# Patient Record
Sex: MALE | Race: ASIAN | NOT HISPANIC OR LATINO | URBAN - METROPOLITAN AREA
[De-identification: names, ages, dates, MRNs, and addresses within clinical notes are randomized per-mention and may not be internally consistent; named-entity substitution may affect disease eponyms.]

---

## 2017-02-12 ENCOUNTER — COMMUNICATION - HEALTHEAST (OUTPATIENT)
Dept: FAMILY MEDICINE | Facility: CLINIC | Age: 57
End: 2017-02-12

## 2017-02-12 DIAGNOSIS — I10 HTN (HYPERTENSION): ICD-10-CM

## 2017-02-12 DIAGNOSIS — E78.5 HYPERLIPIDEMIA: ICD-10-CM

## 2017-02-15 ENCOUNTER — COMMUNICATION - HEALTHEAST (OUTPATIENT)
Dept: CARDIOLOGY | Facility: CLINIC | Age: 57
End: 2017-02-15

## 2017-02-15 DIAGNOSIS — I25.5 ISCHEMIC CARDIOMYOPATHY: ICD-10-CM

## 2017-03-14 ENCOUNTER — OFFICE VISIT - HEALTHEAST (OUTPATIENT)
Dept: FAMILY MEDICINE | Facility: CLINIC | Age: 57
End: 2017-03-14

## 2017-03-14 DIAGNOSIS — E11.9 TYPE 2 DIABETES MELLITUS WITHOUT COMPLICATION (H): ICD-10-CM

## 2017-03-14 DIAGNOSIS — I10 ESSENTIAL HYPERTENSION, BENIGN: ICD-10-CM

## 2017-03-14 DIAGNOSIS — I25.10 CAD (CORONARY ARTERY DISEASE): ICD-10-CM

## 2017-03-14 DIAGNOSIS — E78.00 PURE HYPERCHOLESTEROLEMIA: ICD-10-CM

## 2017-03-14 DIAGNOSIS — I67.89 ACUTE, BUT ILL-DEFINED, CEREBROVASCULAR DISEASE: ICD-10-CM

## 2017-03-14 DIAGNOSIS — Z01.818 PRE-OP EXAMINATION: ICD-10-CM

## 2017-03-14 ASSESSMENT — MIFFLIN-ST. JEOR: SCORE: 1402.32

## 2017-03-25 ENCOUNTER — COMMUNICATION - HEALTHEAST (OUTPATIENT)
Dept: LAB | Facility: CLINIC | Age: 57
End: 2017-03-25

## 2017-03-25 DIAGNOSIS — E11.9 DIABETES (H): ICD-10-CM

## 2017-03-26 ENCOUNTER — COMMUNICATION - HEALTHEAST (OUTPATIENT)
Dept: SCHEDULING | Facility: CLINIC | Age: 57
End: 2017-03-26

## 2017-03-27 ENCOUNTER — OFFICE VISIT - HEALTHEAST (OUTPATIENT)
Dept: FAMILY MEDICINE | Facility: CLINIC | Age: 57
End: 2017-03-27

## 2017-03-27 DIAGNOSIS — I10 ESSENTIAL HYPERTENSION, BENIGN: ICD-10-CM

## 2017-03-27 DIAGNOSIS — E78.00 PURE HYPERCHOLESTEROLEMIA: ICD-10-CM

## 2017-03-27 DIAGNOSIS — I69.30 HISTORY OF STROKE WITH RESIDUAL DEFICIT: ICD-10-CM

## 2017-03-27 DIAGNOSIS — I42.9 CARDIOMYOPATHY (H): ICD-10-CM

## 2017-04-06 ENCOUNTER — HOSPITAL ENCOUNTER (OUTPATIENT)
Dept: CARDIOLOGY | Facility: HOSPITAL | Age: 57
Discharge: HOME | End: 2017-04-06
Attending: FAMILY MEDICINE

## 2017-04-06 DIAGNOSIS — I42.9 CARDIOMYOPATHY (H): ICD-10-CM

## 2017-04-06 ASSESSMENT — MIFFLIN-ST. JEOR: SCORE: 1415.93

## 2017-04-07 ENCOUNTER — COMMUNICATION - HEALTHEAST (OUTPATIENT)
Dept: FAMILY MEDICINE | Facility: CLINIC | Age: 57
End: 2017-04-07

## 2017-04-07 LAB
AORTIC ROOT: 3.7 CM
AR DECEL SLOPE: 1960 MM/S2
AR PEAK VELOCITY: 418 CM/S
AV REGURGITANT PEAK GRADIENT: 69.9 MMHG
AV REGURGITATION PRESSURE HALF TIME: 611 MS
BSA FOR ECHO PROCEDURE: 1.76 M2
CV BLOOD PRESSURE: NORMAL MMHG
CV ECHO HEIGHT: 64 IN
CV ECHO WEIGHT: 151 LBS
DOP CALC LVOT AREA: 3.46 CM2
DOP CALC LVOT DIAMETER: 2.1 CM
DOP CALC LVOT PEAK VEL: 82.9 CM/S
DOP CALC LVOT STROKE VOLUME: 49.5 CM3
DOP CALCLVOT PEAK VEL VTI: 14.3 CM
ECHO EJECTION FRACTION ESTIMATED: 20 %
EJECTION FRACTION: 17 % (ref 55–75)
FRACTIONAL SHORTENING: 8.6 % (ref 28–44)
INTERVENTRICULAR SEPTUM IN END DIASTOLE: 1.02 CM (ref 0.6–1)
IVS/PW RATIO: 1.1
LA AREA 1: 14.8 CM2
LEFT ATRIUM LENGTH: 4.7 CM
LEFT ATRIUM SIZE: 3.6 CM
LEFT VENTRICLE CARDIAC INDEX: 2 L/MIN/M2
LEFT VENTRICLE CARDIAC OUTPUT: 3.5 L/MIN
LEFT VENTRICLE DIASTOLIC VOLUME INDEX: 94.3 CM3/M2 (ref 34–74)
LEFT VENTRICLE DIASTOLIC VOLUME: 166 CM3 (ref 62–150)
LEFT VENTRICLE HEART RATE: 70 BPM
LEFT VENTRICLE MASS INDEX: 112.5 G/M2
LEFT VENTRICLE SYSTOLIC VOLUME INDEX: 77.8 CM3/M2 (ref 11–31)
LEFT VENTRICLE SYSTOLIC VOLUME: 137 CM3 (ref 21–61)
LEFT VENTRICULAR INTERNAL DIMENSION IN DIASTOLE: 5.35 CM (ref 4.2–5.8)
LEFT VENTRICULAR INTERNAL DIMENSION IN SYSTOLE: 4.89 CM (ref 2.5–4)
LEFT VENTRICULAR MASS: 197.9 G
LEFT VENTRICULAR OUTFLOW TRACT MEAN GRADIENT: 1 MMHG
LEFT VENTRICULAR OUTFLOW TRACT MEAN VELOCITY: 55.2 CM/S
LEFT VENTRICULAR OUTFLOW TRACT PEAK GRADIENT: 3 MMHG
LEFT VENTRICULAR POSTERIOR WALL IN END DIASTOLE: 0.94 CM (ref 0.6–1)
LV STROKE VOLUME INDEX: 28.1 ML/M2
MITRAL REGURGITANT VELOCITY TIME INTEGRAL: 181 CM
MITRAL VALVE E/A RATIO: 0.9
MR FLOW: 74 CM3
MR MEAN GRADIENT: 70 MMHG
MR MEAN VELOCITY: 388 CM/S
MR PEAK GRADIENT: 105.3 MMHG
MV AVERAGE E/E' RATIO: 7.5 CM/S
MV DECELERATION TIME: 155 MS
MV E'TISSUE VEL-LAT: 18.1 CM/S
MV E'TISSUE VEL-MED: 6.77 CM/S
MV LATERAL E/E' RATIO: 5.1
MV MEDIAL E/E' RATIO: 13.8
MV PEAK A VELOCITY: 108 CM/S
MV PEAK E VELOCITY: 93.1 CM/S
NUC REST DIASTOLIC VOLUME INDEX: 2416 LBS
NUC REST SYSTOLIC VOLUME INDEX: 64 IN
PISA MR PEAK VEL: 513 CM/S
TRICUSPID REGURGITATION PEAK PRESSURE GRADIENT: 42.5 MMHG
TRICUSPID VALVE ANULAR PLANE SYSTOLIC EXCURSION: 1.6 CM
TRICUSPID VALVE PEAK REGURGITANT VELOCITY: 326 CM/S

## 2017-04-11 ENCOUNTER — COMMUNICATION - HEALTHEAST (OUTPATIENT)
Dept: FAMILY MEDICINE | Facility: CLINIC | Age: 57
End: 2017-04-11

## 2017-04-11 ENCOUNTER — AMBULATORY - HEALTHEAST (OUTPATIENT)
Dept: LAB | Facility: CLINIC | Age: 57
End: 2017-04-11

## 2017-04-11 DIAGNOSIS — Z00.00 HEALTH CARE MAINTENANCE: ICD-10-CM

## 2017-04-19 ENCOUNTER — RECORDS - HEALTHEAST (OUTPATIENT)
Dept: ADMINISTRATIVE | Facility: OTHER | Age: 57
End: 2017-04-19

## 2017-04-20 ENCOUNTER — RECORDS - HEALTHEAST (OUTPATIENT)
Dept: ADMINISTRATIVE | Facility: OTHER | Age: 57
End: 2017-04-20

## 2017-04-23 ENCOUNTER — RECORDS - HEALTHEAST (OUTPATIENT)
Dept: ADMINISTRATIVE | Facility: OTHER | Age: 57
End: 2017-04-23

## 2017-04-25 ENCOUNTER — RECORDS - HEALTHEAST (OUTPATIENT)
Dept: ADMINISTRATIVE | Facility: OTHER | Age: 57
End: 2017-04-25

## 2017-05-01 ENCOUNTER — RECORDS - HEALTHEAST (OUTPATIENT)
Dept: ADMINISTRATIVE | Facility: OTHER | Age: 57
End: 2017-05-01

## 2017-05-17 ENCOUNTER — RECORDS - HEALTHEAST (OUTPATIENT)
Dept: ADMINISTRATIVE | Facility: OTHER | Age: 57
End: 2017-05-17

## 2017-06-06 ENCOUNTER — RECORDS - HEALTHEAST (OUTPATIENT)
Dept: ADMINISTRATIVE | Facility: OTHER | Age: 57
End: 2017-06-06

## 2017-06-11 ENCOUNTER — COMMUNICATION - HEALTHEAST (OUTPATIENT)
Dept: CARDIOLOGY | Facility: CLINIC | Age: 57
End: 2017-06-11

## 2017-06-11 DIAGNOSIS — I10 ESSENTIAL HYPERTENSION: ICD-10-CM

## 2017-06-23 ENCOUNTER — COMMUNICATION - HEALTHEAST (OUTPATIENT)
Dept: ADMINISTRATIVE | Facility: CLINIC | Age: 57
End: 2017-06-23

## 2017-07-24 ENCOUNTER — COMMUNICATION - HEALTHEAST (OUTPATIENT)
Dept: FAMILY MEDICINE | Facility: CLINIC | Age: 57
End: 2017-07-24

## 2017-07-26 ENCOUNTER — OFFICE VISIT - HEALTHEAST (OUTPATIENT)
Dept: FAMILY MEDICINE | Facility: CLINIC | Age: 57
End: 2017-07-26

## 2017-07-26 DIAGNOSIS — I25.10 CORONARY ARTERY DISEASE DUE TO LIPID RICH PLAQUE: ICD-10-CM

## 2017-07-26 DIAGNOSIS — I25.83 CORONARY ARTERY DISEASE DUE TO LIPID RICH PLAQUE: ICD-10-CM

## 2017-07-26 DIAGNOSIS — H66.90 OTITIS MEDIA: ICD-10-CM

## 2017-07-26 DIAGNOSIS — H72.91 TYMPANIC MEMBRANE PERFORATION, RIGHT: ICD-10-CM

## 2017-07-26 DIAGNOSIS — I69.30 HISTORY OF STROKE WITH RESIDUAL DEFICIT: ICD-10-CM

## 2017-07-26 DIAGNOSIS — I10 ESSENTIAL HYPERTENSION, BENIGN: ICD-10-CM

## 2017-07-26 DIAGNOSIS — E78.00 PURE HYPERCHOLESTEROLEMIA: ICD-10-CM

## 2017-07-26 DIAGNOSIS — I42.9 CARDIOMYOPATHY (H): ICD-10-CM

## 2017-07-26 DIAGNOSIS — Z23 IMMUNIZATION DUE: ICD-10-CM

## 2017-07-26 DIAGNOSIS — E11.9 TYPE 2 DIABETES MELLITUS WITHOUT COMPLICATION, WITHOUT LONG-TERM CURRENT USE OF INSULIN (H): ICD-10-CM

## 2017-07-26 DIAGNOSIS — H66.91: ICD-10-CM

## 2017-07-26 LAB
HBA1C MFR BLD: 6.3 % (ref 3.5–6)
LDLC SERPL CALC-MCNC: 78 MG/DL

## 2017-07-27 ENCOUNTER — RECORDS - HEALTHEAST (OUTPATIENT)
Dept: ADMINISTRATIVE | Facility: OTHER | Age: 57
End: 2017-07-27

## 2017-07-27 ENCOUNTER — COMMUNICATION - HEALTHEAST (OUTPATIENT)
Dept: FAMILY MEDICINE | Facility: CLINIC | Age: 57
End: 2017-07-27

## 2017-12-29 ENCOUNTER — OFFICE VISIT - HEALTHEAST (OUTPATIENT)
Dept: FAMILY MEDICINE | Facility: CLINIC | Age: 57
End: 2017-12-29

## 2017-12-29 ENCOUNTER — COMMUNICATION - HEALTHEAST (OUTPATIENT)
Dept: FAMILY MEDICINE | Facility: CLINIC | Age: 57
End: 2017-12-29

## 2017-12-29 DIAGNOSIS — I25.10 CORONARY ARTERY DISEASE DUE TO LIPID RICH PLAQUE: ICD-10-CM

## 2017-12-29 DIAGNOSIS — E11.9 TYPE 2 DIABETES MELLITUS WITHOUT COMPLICATION (H): ICD-10-CM

## 2017-12-29 DIAGNOSIS — I25.83 CORONARY ARTERY DISEASE DUE TO LIPID RICH PLAQUE: ICD-10-CM

## 2017-12-29 DIAGNOSIS — I69.30 HISTORY OF STROKE WITH RESIDUAL DEFICIT: ICD-10-CM

## 2017-12-29 DIAGNOSIS — I10 ESSENTIAL HYPERTENSION, BENIGN: ICD-10-CM

## 2017-12-29 DIAGNOSIS — E78.00 PURE HYPERCHOLESTEROLEMIA: ICD-10-CM

## 2017-12-29 LAB — HBA1C MFR BLD: 6.1 % (ref 3.5–6)

## 2018-01-09 ENCOUNTER — AMBULATORY - HEALTHEAST (OUTPATIENT)
Dept: NURSING | Facility: CLINIC | Age: 58
End: 2018-01-09

## 2018-01-15 ENCOUNTER — AMBULATORY - HEALTHEAST (OUTPATIENT)
Dept: CARDIOLOGY | Facility: CLINIC | Age: 58
End: 2018-01-15

## 2018-01-24 ENCOUNTER — AMBULATORY - HEALTHEAST (OUTPATIENT)
Dept: NURSING | Facility: CLINIC | Age: 58
End: 2018-01-24

## 2018-05-02 ENCOUNTER — COMMUNICATION - HEALTHEAST (OUTPATIENT)
Dept: SCHEDULING | Facility: CLINIC | Age: 58
End: 2018-05-02

## 2018-05-23 ENCOUNTER — OFFICE VISIT - HEALTHEAST (OUTPATIENT)
Dept: FAMILY MEDICINE | Facility: CLINIC | Age: 58
End: 2018-05-23

## 2018-05-23 ENCOUNTER — COMMUNICATION - HEALTHEAST (OUTPATIENT)
Dept: FAMILY MEDICINE | Facility: CLINIC | Age: 58
End: 2018-05-23

## 2018-05-23 DIAGNOSIS — I42.9 CARDIOMYOPATHY (H): ICD-10-CM

## 2018-05-23 DIAGNOSIS — E78.00 PURE HYPERCHOLESTEROLEMIA: ICD-10-CM

## 2018-05-23 DIAGNOSIS — E11.9 TYPE 2 DIABETES MELLITUS WITHOUT COMPLICATION (H): ICD-10-CM

## 2018-05-23 DIAGNOSIS — I10 ESSENTIAL HYPERTENSION, BENIGN: ICD-10-CM

## 2018-05-23 DIAGNOSIS — H72.90 TYMPANIC MEMBRANE PERFORATION: ICD-10-CM

## 2018-05-23 DIAGNOSIS — I69.30 HISTORY OF STROKE WITH RESIDUAL DEFICIT: ICD-10-CM

## 2018-05-23 LAB
CREAT UR-MCNC: 193.6 MG/DL
MICROALBUMIN UR-MCNC: 8.57 MG/DL (ref 0–1.99)
MICROALBUMIN/CREAT UR: 44.3 MG/G

## 2018-06-01 ENCOUNTER — COMMUNICATION - HEALTHEAST (OUTPATIENT)
Dept: SCHEDULING | Facility: CLINIC | Age: 58
End: 2018-06-01

## 2018-06-01 DIAGNOSIS — E78.5 HYPERLIPIDEMIA: ICD-10-CM

## 2018-06-08 ENCOUNTER — COMMUNICATION - HEALTHEAST (OUTPATIENT)
Dept: CARDIOLOGY | Facility: CLINIC | Age: 58
End: 2018-06-08

## 2018-06-08 ENCOUNTER — OFFICE VISIT - HEALTHEAST (OUTPATIENT)
Dept: CARDIOLOGY | Facility: CLINIC | Age: 58
End: 2018-06-08

## 2018-06-08 ENCOUNTER — AMBULATORY - HEALTHEAST (OUTPATIENT)
Dept: CARDIOLOGY | Facility: CLINIC | Age: 58
End: 2018-06-08

## 2018-06-08 DIAGNOSIS — Z86.74 HX-SUDDEN CARDIAC ARREST: ICD-10-CM

## 2018-06-08 DIAGNOSIS — I25.5 ISCHEMIC CARDIOMYOPATHY: ICD-10-CM

## 2018-06-08 DIAGNOSIS — Z95.810 ICD (IMPLANTABLE CARDIOVERTER-DEFIBRILLATOR) IN PLACE: ICD-10-CM

## 2018-06-08 DIAGNOSIS — I50.22 CHRONIC SYSTOLIC HEART FAILURE (H): ICD-10-CM

## 2018-06-08 LAB
ANION GAP SERPL CALCULATED.3IONS-SCNC: 6 MMOL/L (ref 5–18)
BNP SERPL-MCNC: 173 PG/ML (ref 0–46)
BUN SERPL-MCNC: 15 MG/DL (ref 8–22)
CALCIUM SERPL-MCNC: 9.4 MG/DL (ref 8.5–10.5)
CHLORIDE BLD-SCNC: 107 MMOL/L (ref 98–107)
CO2 SERPL-SCNC: 29 MMOL/L (ref 22–31)
CREAT SERPL-MCNC: 0.91 MG/DL (ref 0.7–1.3)
GFR SERPL CREATININE-BSD FRML MDRD: >60 ML/MIN/1.73M2
GLUCOSE BLD-MCNC: 159 MG/DL (ref 70–125)
HCC DEVICE COMMENTS: NORMAL
HCC DEVICE IMPLANTING PROVIDER: NORMAL
HCC DEVICE MANUFACTURE: NORMAL
HCC DEVICE MODEL: NORMAL
HCC DEVICE SERIAL NUMBER: NORMAL
HCC DEVICE TYPE: NORMAL
POTASSIUM BLD-SCNC: 4 MMOL/L (ref 3.5–5)
SODIUM SERPL-SCNC: 142 MMOL/L (ref 136–145)

## 2018-06-11 ENCOUNTER — COMMUNICATION - HEALTHEAST (OUTPATIENT)
Dept: CARDIOLOGY | Facility: CLINIC | Age: 58
End: 2018-06-11

## 2018-06-11 DIAGNOSIS — I25.5 ISCHEMIC CARDIOMYOPATHY: ICD-10-CM

## 2018-06-11 DIAGNOSIS — I50.22 CHRONIC SYSTOLIC HEART FAILURE (H): ICD-10-CM

## 2018-06-18 ENCOUNTER — COMMUNICATION - HEALTHEAST (OUTPATIENT)
Dept: CARDIOLOGY | Facility: CLINIC | Age: 58
End: 2018-06-18

## 2018-06-18 DIAGNOSIS — I25.10 CORONARY ARTERY DISEASE DUE TO LIPID RICH PLAQUE: ICD-10-CM

## 2018-06-18 DIAGNOSIS — I25.83 CORONARY ARTERY DISEASE DUE TO LIPID RICH PLAQUE: ICD-10-CM

## 2018-06-20 ENCOUNTER — COMMUNICATION - HEALTHEAST (OUTPATIENT)
Dept: FAMILY MEDICINE | Facility: CLINIC | Age: 58
End: 2018-06-20

## 2018-06-20 DIAGNOSIS — I42.9 CARDIOMYOPATHY (H): ICD-10-CM

## 2018-07-02 ENCOUNTER — OFFICE VISIT - HEALTHEAST (OUTPATIENT)
Dept: CARDIOLOGY | Facility: CLINIC | Age: 58
End: 2018-07-02

## 2018-07-02 ENCOUNTER — AMBULATORY - HEALTHEAST (OUTPATIENT)
Dept: CARDIOLOGY | Facility: CLINIC | Age: 58
End: 2018-07-02

## 2018-07-02 DIAGNOSIS — I42.9 CARDIOMYOPATHY (H): ICD-10-CM

## 2018-07-02 DIAGNOSIS — I10 ESSENTIAL HYPERTENSION, BENIGN: ICD-10-CM

## 2018-07-02 DIAGNOSIS — I50.22 CHRONIC SYSTOLIC HEART FAILURE (H): ICD-10-CM

## 2018-07-02 LAB
ANION GAP SERPL CALCULATED.3IONS-SCNC: 8 MMOL/L (ref 5–18)
BUN SERPL-MCNC: 10 MG/DL (ref 8–22)
CALCIUM SERPL-MCNC: 9.1 MG/DL (ref 8.5–10.5)
CHLORIDE BLD-SCNC: 109 MMOL/L (ref 98–107)
CO2 SERPL-SCNC: 26 MMOL/L (ref 22–31)
CREAT SERPL-MCNC: 0.81 MG/DL (ref 0.7–1.3)
GFR SERPL CREATININE-BSD FRML MDRD: >60 ML/MIN/1.73M2
GLUCOSE BLD-MCNC: 132 MG/DL (ref 70–125)
POTASSIUM BLD-SCNC: 4.4 MMOL/L (ref 3.5–5)
SODIUM SERPL-SCNC: 143 MMOL/L (ref 136–145)

## 2018-07-02 ASSESSMENT — MIFFLIN-ST. JEOR: SCORE: 1429.54

## 2018-07-03 ENCOUNTER — COMMUNICATION - HEALTHEAST (OUTPATIENT)
Dept: CARDIOLOGY | Facility: CLINIC | Age: 58
End: 2018-07-03

## 2018-07-23 ENCOUNTER — AMBULATORY - HEALTHEAST (OUTPATIENT)
Dept: CARDIOLOGY | Facility: CLINIC | Age: 58
End: 2018-07-23

## 2018-07-23 ENCOUNTER — OFFICE VISIT - HEALTHEAST (OUTPATIENT)
Dept: CARDIOLOGY | Facility: CLINIC | Age: 58
End: 2018-07-23

## 2018-07-23 DIAGNOSIS — I42.9 CARDIOMYOPATHY (H): ICD-10-CM

## 2018-07-23 DIAGNOSIS — I50.20 HFREF (HEART FAILURE WITH REDUCED EJECTION FRACTION) (H): ICD-10-CM

## 2018-07-23 LAB
ANION GAP SERPL CALCULATED.3IONS-SCNC: 9 MMOL/L (ref 5–18)
BUN SERPL-MCNC: 14 MG/DL (ref 8–22)
CALCIUM SERPL-MCNC: 9.3 MG/DL (ref 8.5–10.5)
CHLORIDE BLD-SCNC: 107 MMOL/L (ref 98–107)
CO2 SERPL-SCNC: 26 MMOL/L (ref 22–31)
CREAT SERPL-MCNC: 0.8 MG/DL (ref 0.7–1.3)
GFR SERPL CREATININE-BSD FRML MDRD: >60 ML/MIN/1.73M2
GLUCOSE BLD-MCNC: 125 MG/DL (ref 70–125)
POTASSIUM BLD-SCNC: 4.7 MMOL/L (ref 3.5–5)
SODIUM SERPL-SCNC: 142 MMOL/L (ref 136–145)

## 2018-07-23 ASSESSMENT — MIFFLIN-ST. JEOR: SCORE: 1438.61

## 2018-08-06 ENCOUNTER — AMBULATORY - HEALTHEAST (OUTPATIENT)
Dept: CARDIOLOGY | Facility: CLINIC | Age: 58
End: 2018-08-06

## 2018-08-06 DIAGNOSIS — I50.20 HFREF (HEART FAILURE WITH REDUCED EJECTION FRACTION) (H): ICD-10-CM

## 2018-09-05 ENCOUNTER — OFFICE VISIT - HEALTHEAST (OUTPATIENT)
Dept: CARDIOLOGY | Facility: CLINIC | Age: 58
End: 2018-09-05

## 2018-09-05 DIAGNOSIS — I50.22 CHRONIC SYSTOLIC HEART FAILURE (H): ICD-10-CM

## 2018-09-05 LAB
ANION GAP SERPL CALCULATED.3IONS-SCNC: 8 MMOL/L (ref 5–18)
BUN SERPL-MCNC: 14 MG/DL (ref 8–22)
CALCIUM SERPL-MCNC: 9.1 MG/DL (ref 8.5–10.5)
CHLORIDE BLD-SCNC: 106 MMOL/L (ref 98–107)
CO2 SERPL-SCNC: 28 MMOL/L (ref 22–31)
CREAT SERPL-MCNC: 0.99 MG/DL (ref 0.7–1.3)
GFR SERPL CREATININE-BSD FRML MDRD: >60 ML/MIN/1.73M2
GLUCOSE BLD-MCNC: 168 MG/DL (ref 70–125)
POTASSIUM BLD-SCNC: 4 MMOL/L (ref 3.5–5)
SODIUM SERPL-SCNC: 142 MMOL/L (ref 136–145)

## 2018-10-09 ENCOUNTER — HOSPITAL ENCOUNTER (OUTPATIENT)
Dept: CARDIOLOGY | Facility: HOSPITAL | Age: 58
Discharge: HOME | End: 2018-10-09

## 2018-10-09 ENCOUNTER — AMBULATORY - HEALTHEAST (OUTPATIENT)
Dept: CARDIOLOGY | Facility: CLINIC | Age: 58
End: 2018-10-09

## 2018-10-09 DIAGNOSIS — I50.22 CHRONIC SYSTOLIC HEART FAILURE (H): ICD-10-CM

## 2018-10-10 LAB
AORTIC ROOT: 3.7 CM
AORTIC VALVE MEAN VELOCITY: 94.5 CM/S
AR DECEL SLOPE: 1150 MM/S2
AR PEAK VELOCITY: 338 CM/S
AV DIMENSIONLESS INDEX VTI: 0.6
AV MEAN GRADIENT: 4 MMHG
AV PEAK GRADIENT: 5.3 MMHG
AV REGURGITANT PEAK GRADIENT: 45.7 MMHG
AV REGURGITATION PRESSURE HALF TIME: 861 MS
AV VALVE AREA: 1.8 CM2
AV VELOCITY RATIO: 0.8
DOP CALC AO PEAK VEL: 115 CM/S
DOP CALC AO VTI: 23.8 CM
DOP CALC LVOT AREA: 2.83 CM2
DOP CALC LVOT DIAMETER: 1.9 CM
DOP CALC LVOT PEAK VEL: 96.1 CM/S
DOP CALC LVOT STROKE VOLUME: 41.7 CM3
DOP CALCLVOT PEAK VEL VTI: 14.7 CM
EJECTION FRACTION: 32 % (ref 55–75)
FRACTIONAL SHORTENING: 22.1 % (ref 28–44)
INTERVENTRICULAR SEPTUM IN END DIASTOLE: 1.02 CM (ref 0.6–1)
IVS/PW RATIO: 0.9
LA AREA 1: 14.1 CM2
LA AREA 2: 17 CM2
LEFT ATRIUM LENGTH: 4.4 CM
LEFT ATRIUM SIZE: 4.2 CM
LEFT ATRIUM VOLUME: 46.3 ML
LEFT VENTRICLE DIASTOLIC VOLUME: 182 CM3 (ref 62–150)
LEFT VENTRICLE SYSTOLIC VOLUME: 123 CM3 (ref 21–61)
LEFT VENTRICULAR INTERNAL DIMENSION IN DIASTOLE: 5.88 CM (ref 4.2–5.8)
LEFT VENTRICULAR INTERNAL DIMENSION IN SYSTOLE: 4.58 CM (ref 2.5–4)
LEFT VENTRICULAR MASS: 257.4 G
LEFT VENTRICULAR OUTFLOW TRACT MEAN GRADIENT: 2 MMHG
LEFT VENTRICULAR OUTFLOW TRACT MEAN VELOCITY: 67.6 CM/S
LEFT VENTRICULAR OUTFLOW TRACT PEAK GRADIENT: 4 MMHG
LEFT VENTRICULAR POSTERIOR WALL IN END DIASTOLE: 1.1 CM (ref 0.6–1)
MITRAL REGURGITANT VELOCITY TIME INTEGRAL: 159 CM
MITRAL VALVE E/A RATIO: 0.5
MR FLOW: 27 CM3
MR MEAN GRADIENT: 44 MMHG
MR MEAN GRADIENT: 46 MMHG
MR MEAN GRADIENT: 48 MMHG
MR MEAN GRADIENT: 54 MMHG
MR MEAN VELOCITY: 317 CM/S
MR MEAN VELOCITY: 326 CM/S
MR MEAN VELOCITY: 332 CM/S
MR MEAN VELOCITY: 352 CM/S
MR PEAK GRADIENT: 83.5 MMHG
MV AVERAGE E/E' RATIO: 10.8 CM/S
MV DECELERATION TIME: 169 MS
MV E'TISSUE VEL-LAT: 4.64 CM/S
MV E'TISSUE VEL-MED: 3.29 CM/S
MV LATERAL E/E' RATIO: 9.2
MV MEDIAL E/E' RATIO: 13
MV PEAK A VELOCITY: 88.3 CM/S
MV PEAK E VELOCITY: 42.7 CM/S
PISA MR PEAK VEL: 457 CM/S
TRICUSPID REGURGITATION PEAK PRESSURE GRADIENT: 22.3 MMHG
TRICUSPID VALVE ANULAR PLANE SYSTOLIC EXCURSION: 1.4 CM
TRICUSPID VALVE PEAK REGURGITANT VELOCITY: 236 CM/S

## 2018-12-09 ENCOUNTER — COMMUNICATION - HEALTHEAST (OUTPATIENT)
Dept: SCHEDULING | Facility: CLINIC | Age: 58
End: 2018-12-09

## 2018-12-09 ENCOUNTER — COMMUNICATION - HEALTHEAST (OUTPATIENT)
Dept: CARDIOLOGY | Facility: CLINIC | Age: 58
End: 2018-12-09

## 2018-12-09 DIAGNOSIS — E78.5 HYPERLIPIDEMIA: ICD-10-CM

## 2018-12-09 DIAGNOSIS — I25.83 CORONARY ARTERY DISEASE DUE TO LIPID RICH PLAQUE: ICD-10-CM

## 2018-12-09 DIAGNOSIS — I25.10 CORONARY ARTERY DISEASE DUE TO LIPID RICH PLAQUE: ICD-10-CM

## 2018-12-10 ENCOUNTER — OFFICE VISIT - HEALTHEAST (OUTPATIENT)
Dept: FAMILY MEDICINE | Facility: CLINIC | Age: 58
End: 2018-12-10

## 2018-12-10 DIAGNOSIS — Z00.00 ROUTINE GENERAL MEDICAL EXAMINATION AT A HEALTH CARE FACILITY: ICD-10-CM

## 2018-12-10 DIAGNOSIS — I25.10 CORONARY ARTERY DISEASE DUE TO LIPID RICH PLAQUE: ICD-10-CM

## 2018-12-10 DIAGNOSIS — E11.9 TYPE 2 DIABETES MELLITUS WITHOUT COMPLICATION, WITHOUT LONG-TERM CURRENT USE OF INSULIN (H): ICD-10-CM

## 2018-12-10 DIAGNOSIS — E78.00 PURE HYPERCHOLESTEROLEMIA: ICD-10-CM

## 2018-12-10 DIAGNOSIS — H69.90 DYSFUNCTION OF EUSTACHIAN TUBE, UNSPECIFIED LATERALITY: ICD-10-CM

## 2018-12-10 DIAGNOSIS — I69.30 HISTORY OF STROKE WITH RESIDUAL DEFICIT: ICD-10-CM

## 2018-12-10 DIAGNOSIS — K29.70 GASTRITIS AND GASTRODUODENITIS: ICD-10-CM

## 2018-12-10 DIAGNOSIS — Z95.810 ICD (IMPLANTABLE CARDIOVERTER-DEFIBRILLATOR) IN PLACE: ICD-10-CM

## 2018-12-10 DIAGNOSIS — I10 ESSENTIAL HYPERTENSION, BENIGN: ICD-10-CM

## 2018-12-10 DIAGNOSIS — I50.20 SYSTOLIC HEART FAILURE, UNSPECIFIED HF CHRONICITY (H): ICD-10-CM

## 2018-12-10 DIAGNOSIS — K29.90 GASTRITIS AND GASTRODUODENITIS: ICD-10-CM

## 2018-12-10 DIAGNOSIS — J30.1 NON-SEASONAL ALLERGIC RHINITIS DUE TO POLLEN: ICD-10-CM

## 2018-12-10 DIAGNOSIS — I25.5 ISCHEMIC CARDIOMYOPATHY: ICD-10-CM

## 2018-12-10 DIAGNOSIS — I25.83 CORONARY ARTERY DISEASE DUE TO LIPID RICH PLAQUE: ICD-10-CM

## 2018-12-10 LAB
HBA1C MFR BLD: 6.9 % (ref 3.5–6)
LDLC SERPL CALC-MCNC: 154 MG/DL

## 2018-12-10 ASSESSMENT — MIFFLIN-ST. JEOR: SCORE: 1447.68

## 2018-12-12 ENCOUNTER — COMMUNICATION - HEALTHEAST (OUTPATIENT)
Dept: FAMILY MEDICINE | Facility: CLINIC | Age: 58
End: 2018-12-12

## 2019-02-19 ENCOUNTER — COMMUNICATION - HEALTHEAST (OUTPATIENT)
Dept: FAMILY MEDICINE | Facility: CLINIC | Age: 59
End: 2019-02-19

## 2019-02-20 ENCOUNTER — AMBULATORY - HEALTHEAST (OUTPATIENT)
Dept: FAMILY MEDICINE | Facility: CLINIC | Age: 59
End: 2019-02-20

## 2019-02-21 ENCOUNTER — COMMUNICATION - HEALTHEAST (OUTPATIENT)
Dept: LAB | Facility: CLINIC | Age: 59
End: 2019-02-21

## 2019-02-21 DIAGNOSIS — E11.9 DIABETES (H): ICD-10-CM

## 2019-02-22 ENCOUNTER — RECORDS - HEALTHEAST (OUTPATIENT)
Dept: FAMILY MEDICINE | Facility: CLINIC | Age: 59
End: 2019-02-22

## 2019-02-22 ENCOUNTER — OFFICE VISIT - HEALTHEAST (OUTPATIENT)
Dept: FAMILY MEDICINE | Facility: CLINIC | Age: 59
End: 2019-02-22

## 2019-02-22 ENCOUNTER — RECORDS - HEALTHEAST (OUTPATIENT)
Dept: ADMINISTRATIVE | Facility: OTHER | Age: 59
End: 2019-02-22

## 2019-02-22 ENCOUNTER — COMMUNICATION - HEALTHEAST (OUTPATIENT)
Dept: FAMILY MEDICINE | Facility: CLINIC | Age: 59
End: 2019-02-22

## 2019-02-22 DIAGNOSIS — Z12.11 SCREEN FOR COLON CANCER: ICD-10-CM

## 2019-02-22 DIAGNOSIS — E11.9 TYPE 2 DIABETES MELLITUS WITHOUT COMPLICATION, WITHOUT LONG-TERM CURRENT USE OF INSULIN (H): ICD-10-CM

## 2019-02-22 DIAGNOSIS — I69.30 HISTORY OF STROKE WITH RESIDUAL DEFICIT: ICD-10-CM

## 2019-02-22 DIAGNOSIS — I25.5 ISCHEMIC CARDIOMYOPATHY: ICD-10-CM

## 2019-02-22 LAB — HBA1C MFR BLD: 8.8 % (ref 3.5–6)

## 2019-02-22 ASSESSMENT — MIFFLIN-ST. JEOR: SCORE: 1443.15

## 2019-02-23 ENCOUNTER — RECORDS - HEALTHEAST (OUTPATIENT)
Dept: ADMINISTRATIVE | Facility: OTHER | Age: 59
End: 2019-02-23

## 2019-02-26 ENCOUNTER — COMMUNICATION - HEALTHEAST (OUTPATIENT)
Dept: FAMILY MEDICINE | Facility: CLINIC | Age: 59
End: 2019-02-26

## 2019-02-26 LAB
HEMOCCULT SP1 STL QL: NEGATIVE
HEMOCCULT SP2 STL QL: NEGATIVE
HEMOCCULT SP3 STL QL: NEGATIVE

## 2019-03-04 ENCOUNTER — RECORDS - HEALTHEAST (OUTPATIENT)
Dept: ADMINISTRATIVE | Facility: OTHER | Age: 59
End: 2019-03-04

## 2019-03-06 ENCOUNTER — COMMUNICATION - HEALTHEAST (OUTPATIENT)
Dept: SCHEDULING | Facility: CLINIC | Age: 59
End: 2019-03-06

## 2019-03-14 ENCOUNTER — RECORDS - HEALTHEAST (OUTPATIENT)
Dept: ADMINISTRATIVE | Facility: OTHER | Age: 59
End: 2019-03-14

## 2019-03-18 ENCOUNTER — RECORDS - HEALTHEAST (OUTPATIENT)
Dept: ADMINISTRATIVE | Facility: OTHER | Age: 59
End: 2019-03-18

## 2019-03-19 ENCOUNTER — COMMUNICATION - HEALTHEAST (OUTPATIENT)
Dept: CARDIOLOGY | Facility: CLINIC | Age: 59
End: 2019-03-19

## 2019-03-19 DIAGNOSIS — I25.83 CORONARY ARTERY DISEASE DUE TO LIPID RICH PLAQUE: ICD-10-CM

## 2019-03-19 DIAGNOSIS — I25.10 CORONARY ARTERY DISEASE DUE TO LIPID RICH PLAQUE: ICD-10-CM

## 2019-03-21 ENCOUNTER — RECORDS - HEALTHEAST (OUTPATIENT)
Dept: ADMINISTRATIVE | Facility: OTHER | Age: 59
End: 2019-03-21

## 2019-03-28 ENCOUNTER — AMBULATORY - HEALTHEAST (OUTPATIENT)
Dept: CARDIOLOGY | Facility: CLINIC | Age: 59
End: 2019-03-28

## 2019-04-10 ENCOUNTER — OFFICE VISIT - HEALTHEAST (OUTPATIENT)
Dept: FAMILY MEDICINE | Facility: CLINIC | Age: 59
End: 2019-04-10

## 2019-04-10 DIAGNOSIS — I25.83 CORONARY ARTERY DISEASE DUE TO LIPID RICH PLAQUE: ICD-10-CM

## 2019-04-10 DIAGNOSIS — E11.9 TYPE 2 DIABETES MELLITUS WITHOUT COMPLICATION, WITHOUT LONG-TERM CURRENT USE OF INSULIN (H): ICD-10-CM

## 2019-04-10 DIAGNOSIS — I25.10 CORONARY ARTERY DISEASE DUE TO LIPID RICH PLAQUE: ICD-10-CM

## 2019-04-10 DIAGNOSIS — E78.00 PURE HYPERCHOLESTEROLEMIA: ICD-10-CM

## 2019-04-10 DIAGNOSIS — I69.30 HISTORY OF STROKE WITH RESIDUAL DEFICIT: ICD-10-CM

## 2019-04-10 LAB
HBA1C MFR BLD: 9 % (ref 3.5–6)
LDLC SERPL CALC-MCNC: 206 MG/DL

## 2019-04-10 RX ORDER — LANCETS 33 GAUGE
EACH MISCELLANEOUS
Refills: 3 | Status: SHIPPED | COMMUNITY
Start: 2019-02-23

## 2019-04-14 ENCOUNTER — COMMUNICATION - HEALTHEAST (OUTPATIENT)
Dept: SCHEDULING | Facility: CLINIC | Age: 59
End: 2019-04-14

## 2019-04-15 ENCOUNTER — AMBULATORY - HEALTHEAST (OUTPATIENT)
Dept: FAMILY MEDICINE | Facility: CLINIC | Age: 59
End: 2019-04-15

## 2019-04-15 DIAGNOSIS — E11.9 TYPE 2 DIABETES MELLITUS WITHOUT COMPLICATION, WITHOUT LONG-TERM CURRENT USE OF INSULIN (H): ICD-10-CM

## 2019-04-15 RX ORDER — GLIPIZIDE 5 MG/1
5 TABLET ORAL
Qty: 180 TABLET | Refills: 0 | Status: SHIPPED | OUTPATIENT
Start: 2019-04-15

## 2019-04-24 ENCOUNTER — COMMUNICATION - HEALTHEAST (OUTPATIENT)
Dept: ANTICOAGULATION | Facility: CLINIC | Age: 59
End: 2019-04-24

## 2019-04-24 DIAGNOSIS — I21.02 ACUTE ST ELEVATION MYOCARDIAL INFARCTION (STEMI) INVOLVING LEFT ANTERIOR DESCENDING CORONARY ARTERY (H): ICD-10-CM

## 2019-04-30 ENCOUNTER — COMMUNICATION - HEALTHEAST (OUTPATIENT)
Dept: SCHEDULING | Facility: CLINIC | Age: 59
End: 2019-04-30

## 2019-05-03 ENCOUNTER — RECORDS - HEALTHEAST (OUTPATIENT)
Dept: ADMINISTRATIVE | Facility: OTHER | Age: 59
End: 2019-05-03

## 2019-05-08 ENCOUNTER — RECORDS - HEALTHEAST (OUTPATIENT)
Dept: ADMINISTRATIVE | Facility: OTHER | Age: 59
End: 2019-05-08

## 2019-06-19 ENCOUNTER — AMBULATORY - HEALTHEAST (OUTPATIENT)
Dept: CARDIOLOGY | Facility: CLINIC | Age: 59
End: 2019-06-19

## 2019-06-19 ENCOUNTER — COMMUNICATION - HEALTHEAST (OUTPATIENT)
Dept: CARDIOLOGY | Facility: CLINIC | Age: 59
End: 2019-06-19

## 2019-06-19 DIAGNOSIS — Z95.810 ICD (IMPLANTABLE CARDIOVERTER-DEFIBRILLATOR) IN PLACE: ICD-10-CM

## 2019-06-19 ASSESSMENT — MIFFLIN-ST. JEOR: SCORE: 1403.37

## 2019-06-20 LAB
HCC DEVICE COMMENTS: NORMAL
HCC DEVICE IMPLANTING PROVIDER: NORMAL
HCC DEVICE MANUFACTURE: NORMAL
HCC DEVICE MODEL: NORMAL
HCC DEVICE SERIAL NUMBER: NORMAL
HCC DEVICE TYPE: NORMAL

## 2019-07-02 ENCOUNTER — COMMUNICATION - HEALTHEAST (OUTPATIENT)
Dept: CARDIOLOGY | Facility: CLINIC | Age: 59
End: 2019-07-02

## 2019-07-06 ENCOUNTER — COMMUNICATION - HEALTHEAST (OUTPATIENT)
Dept: CARDIOLOGY | Facility: CLINIC | Age: 59
End: 2019-07-06

## 2019-07-06 ENCOUNTER — COMMUNICATION - HEALTHEAST (OUTPATIENT)
Dept: FAMILY MEDICINE | Facility: CLINIC | Age: 59
End: 2019-07-06

## 2019-07-06 DIAGNOSIS — I25.10 CORONARY ARTERY DISEASE DUE TO LIPID RICH PLAQUE: ICD-10-CM

## 2019-07-06 DIAGNOSIS — I25.83 CORONARY ARTERY DISEASE DUE TO LIPID RICH PLAQUE: ICD-10-CM

## 2019-07-06 DIAGNOSIS — I42.9 CARDIOMYOPATHY (H): ICD-10-CM

## 2019-07-19 ENCOUNTER — AMBULATORY - HEALTHEAST (OUTPATIENT)
Dept: CARDIOLOGY | Facility: CLINIC | Age: 59
End: 2019-07-19

## 2019-08-05 ENCOUNTER — COMMUNICATION - HEALTHEAST (OUTPATIENT)
Dept: CARDIOLOGY | Facility: CLINIC | Age: 59
End: 2019-08-05

## 2019-08-08 ENCOUNTER — COMMUNICATION - HEALTHEAST (OUTPATIENT)
Dept: CARDIOLOGY | Facility: CLINIC | Age: 59
End: 2019-08-08

## 2019-08-08 ENCOUNTER — COMMUNICATION - HEALTHEAST (OUTPATIENT)
Dept: SCHEDULING | Facility: CLINIC | Age: 59
End: 2019-08-08

## 2019-08-08 DIAGNOSIS — E78.5 HYPERLIPIDEMIA: ICD-10-CM

## 2019-08-08 DIAGNOSIS — I50.20 HFREF (HEART FAILURE WITH REDUCED EJECTION FRACTION) (H): ICD-10-CM

## 2019-08-09 RX ORDER — ATORVASTATIN CALCIUM 80 MG/1
80 TABLET, FILM COATED ORAL AT BEDTIME
Qty: 90 TABLET | Refills: 1 | Status: SHIPPED | OUTPATIENT
Start: 2019-08-09

## 2019-09-18 ENCOUNTER — AMBULATORY - HEALTHEAST (OUTPATIENT)
Dept: CARDIOLOGY | Facility: CLINIC | Age: 59
End: 2019-09-18

## 2019-09-18 DIAGNOSIS — Z95.810 ICD (IMPLANTABLE CARDIOVERTER-DEFIBRILLATOR) IN PLACE: ICD-10-CM

## 2019-10-07 ENCOUNTER — COMMUNICATION - HEALTHEAST (OUTPATIENT)
Dept: CARDIOLOGY | Facility: CLINIC | Age: 59
End: 2019-10-07

## 2019-10-07 DIAGNOSIS — I25.83 CORONARY ARTERY DISEASE DUE TO LIPID RICH PLAQUE: ICD-10-CM

## 2019-10-07 DIAGNOSIS — I25.10 CORONARY ARTERY DISEASE DUE TO LIPID RICH PLAQUE: ICD-10-CM

## 2019-11-01 ENCOUNTER — COMMUNICATION - HEALTHEAST (OUTPATIENT)
Dept: CARDIOLOGY | Facility: CLINIC | Age: 59
End: 2019-11-01

## 2019-11-01 ENCOUNTER — AMBULATORY - HEALTHEAST (OUTPATIENT)
Dept: FAMILY MEDICINE | Facility: CLINIC | Age: 59
End: 2019-11-01

## 2019-11-01 DIAGNOSIS — I25.10 CORONARY ARTERY DISEASE DUE TO LIPID RICH PLAQUE: ICD-10-CM

## 2019-11-01 DIAGNOSIS — I25.83 CORONARY ARTERY DISEASE DUE TO LIPID RICH PLAQUE: ICD-10-CM

## 2019-11-01 RX ORDER — CLOPIDOGREL BISULFATE 75 MG/1
75 TABLET ORAL DAILY
Qty: 30 TABLET | Refills: 0 | Status: SHIPPED | OUTPATIENT
Start: 2019-11-01

## 2019-11-19 ENCOUNTER — AMBULATORY - HEALTHEAST (OUTPATIENT)
Dept: CARDIOLOGY | Facility: CLINIC | Age: 59
End: 2019-11-19

## 2019-11-19 DIAGNOSIS — Z95.810 ICD (IMPLANTABLE CARDIOVERTER-DEFIBRILLATOR) IN PLACE: ICD-10-CM

## 2019-11-21 ENCOUNTER — AMBULATORY - HEALTHEAST (OUTPATIENT)
Dept: MULTI SPECIALTY CLINIC | Facility: CLINIC | Age: 59
End: 2019-11-21

## 2019-12-19 ENCOUNTER — AMBULATORY - HEALTHEAST (OUTPATIENT)
Dept: CARDIOLOGY | Facility: CLINIC | Age: 59
End: 2019-12-19

## 2019-12-19 DIAGNOSIS — I44.2 ATRIOVENTRICULAR BLOCK, COMPLETE (H): ICD-10-CM

## 2019-12-19 DIAGNOSIS — Z95.810 ICD (IMPLANTABLE CARDIOVERTER-DEFIBRILLATOR) IN PLACE: ICD-10-CM

## 2019-12-31 ENCOUNTER — AMBULATORY - HEALTHEAST (OUTPATIENT)
Dept: CARDIOLOGY | Facility: CLINIC | Age: 59
End: 2019-12-31

## 2019-12-31 ENCOUNTER — COMMUNICATION - HEALTHEAST (OUTPATIENT)
Dept: FAMILY MEDICINE | Facility: CLINIC | Age: 59
End: 2019-12-31

## 2019-12-31 ENCOUNTER — COMMUNICATION - HEALTHEAST (OUTPATIENT)
Dept: CARDIOLOGY | Facility: CLINIC | Age: 59
End: 2019-12-31

## 2019-12-31 DIAGNOSIS — I21.09 ST ELEVATION MYOCARDIAL INFARCTION (STEMI) INVOLVING OTHER CORONARY ARTERY OF ANTERIOR WALL (H): ICD-10-CM

## 2019-12-31 DIAGNOSIS — I10 ESSENTIAL HYPERTENSION, BENIGN: ICD-10-CM

## 2019-12-31 DIAGNOSIS — I50.20 SYSTOLIC HEART FAILURE, UNSPECIFIED HF CHRONICITY (H): ICD-10-CM

## 2019-12-31 DIAGNOSIS — I25.83 CORONARY ARTERY DISEASE DUE TO LIPID RICH PLAQUE: ICD-10-CM

## 2019-12-31 DIAGNOSIS — I69.30 HISTORY OF STROKE WITH RESIDUAL DEFICIT: ICD-10-CM

## 2019-12-31 DIAGNOSIS — I44.2 ATRIOVENTRICULAR BLOCK, COMPLETE (H): ICD-10-CM

## 2019-12-31 DIAGNOSIS — I25.10 CORONARY ARTERY DISEASE DUE TO LIPID RICH PLAQUE: ICD-10-CM

## 2019-12-31 DIAGNOSIS — Z95.810 ICD (IMPLANTABLE CARDIOVERTER-DEFIBRILLATOR) IN PLACE: ICD-10-CM

## 2019-12-31 DIAGNOSIS — I21.02 ACUTE ST ELEVATION MYOCARDIAL INFARCTION (STEMI) INVOLVING LEFT ANTERIOR DESCENDING CORONARY ARTERY (H): ICD-10-CM

## 2020-03-23 ENCOUNTER — AMBULATORY - HEALTHEAST (OUTPATIENT)
Dept: CARDIOLOGY | Facility: CLINIC | Age: 60
End: 2020-03-23

## 2020-03-23 DIAGNOSIS — I44.2 ATRIOVENTRICULAR BLOCK, COMPLETE (H): ICD-10-CM

## 2020-03-23 DIAGNOSIS — Z95.810 ICD (IMPLANTABLE CARDIOVERTER-DEFIBRILLATOR) IN PLACE: ICD-10-CM

## 2020-03-30 ENCOUNTER — COMMUNICATION - HEALTHEAST (OUTPATIENT)
Dept: FAMILY MEDICINE | Facility: CLINIC | Age: 60
End: 2020-03-30

## 2020-03-30 DIAGNOSIS — I10 ESSENTIAL HYPERTENSION, BENIGN: ICD-10-CM

## 2020-03-30 DIAGNOSIS — I21.09 ST ELEVATION MYOCARDIAL INFARCTION (STEMI) INVOLVING OTHER CORONARY ARTERY OF ANTERIOR WALL (H): ICD-10-CM

## 2020-03-30 DIAGNOSIS — I21.02 ACUTE ST ELEVATION MYOCARDIAL INFARCTION (STEMI) INVOLVING LEFT ANTERIOR DESCENDING CORONARY ARTERY (H): ICD-10-CM

## 2020-03-30 DIAGNOSIS — I50.20 SYSTOLIC HEART FAILURE, UNSPECIFIED HF CHRONICITY (H): ICD-10-CM

## 2020-03-30 DIAGNOSIS — I69.30 HISTORY OF STROKE WITH RESIDUAL DEFICIT: ICD-10-CM

## 2020-03-30 DIAGNOSIS — I25.83 CORONARY ARTERY DISEASE DUE TO LIPID RICH PLAQUE: ICD-10-CM

## 2020-03-30 DIAGNOSIS — I25.10 CORONARY ARTERY DISEASE DUE TO LIPID RICH PLAQUE: ICD-10-CM

## 2020-04-15 ENCOUNTER — COMMUNICATION - HEALTHEAST (OUTPATIENT)
Dept: FAMILY MEDICINE | Facility: CLINIC | Age: 60
End: 2020-04-15

## 2020-04-15 DIAGNOSIS — E11.9 TYPE 2 DIABETES MELLITUS WITHOUT COMPLICATION, WITHOUT LONG-TERM CURRENT USE OF INSULIN (H): ICD-10-CM

## 2020-04-28 ENCOUNTER — COMMUNICATION - HEALTHEAST (OUTPATIENT)
Dept: FAMILY MEDICINE | Facility: CLINIC | Age: 60
End: 2020-04-28

## 2020-04-28 ENCOUNTER — OFFICE VISIT - HEALTHEAST (OUTPATIENT)
Dept: FAMILY MEDICINE | Facility: CLINIC | Age: 60
End: 2020-04-28

## 2020-04-28 DIAGNOSIS — E78.00 PURE HYPERCHOLESTEROLEMIA: ICD-10-CM

## 2020-04-28 DIAGNOSIS — E11.9 TYPE 2 DIABETES MELLITUS WITHOUT COMPLICATION, WITHOUT LONG-TERM CURRENT USE OF INSULIN (H): ICD-10-CM

## 2020-04-28 DIAGNOSIS — I21.02 ACUTE ST ELEVATION MYOCARDIAL INFARCTION (STEMI) INVOLVING LEFT ANTERIOR DESCENDING CORONARY ARTERY (H): ICD-10-CM

## 2020-04-28 DIAGNOSIS — I25.83 CORONARY ARTERY DISEASE DUE TO LIPID RICH PLAQUE: ICD-10-CM

## 2020-04-28 DIAGNOSIS — I25.5 ISCHEMIC CARDIOMYOPATHY: ICD-10-CM

## 2020-04-28 DIAGNOSIS — I25.10 CORONARY ARTERY DISEASE DUE TO LIPID RICH PLAQUE: ICD-10-CM

## 2020-04-28 DIAGNOSIS — I10 ESSENTIAL HYPERTENSION, BENIGN: ICD-10-CM

## 2020-04-28 RX ORDER — GLYBURIDE 5 MG/1
5 TABLET ORAL
Qty: 90 TABLET | Refills: 1 | Status: SHIPPED | OUTPATIENT
Start: 2020-04-28

## 2020-05-07 ENCOUNTER — COMMUNICATION - HEALTHEAST (OUTPATIENT)
Dept: SCHEDULING | Facility: CLINIC | Age: 60
End: 2020-05-07

## 2020-05-07 DIAGNOSIS — E11.9 TYPE 2 DIABETES MELLITUS WITHOUT COMPLICATION, WITHOUT LONG-TERM CURRENT USE OF INSULIN (H): ICD-10-CM

## 2020-05-07 RX ORDER — GLUCOSAMINE HCL/CHONDROITIN SU 500-400 MG
CAPSULE ORAL
Qty: 100 STRIP | Refills: 11 | Status: SHIPPED | OUTPATIENT
Start: 2020-05-07

## 2020-05-11 ENCOUNTER — RECORDS - HEALTHEAST (OUTPATIENT)
Dept: ADMINISTRATIVE | Facility: OTHER | Age: 60
End: 2020-05-11

## 2020-05-27 ENCOUNTER — RECORDS - HEALTHEAST (OUTPATIENT)
Dept: ADMINISTRATIVE | Facility: OTHER | Age: 60
End: 2020-05-27

## 2020-06-01 ENCOUNTER — RECORDS - HEALTHEAST (OUTPATIENT)
Dept: ADMINISTRATIVE | Facility: OTHER | Age: 60
End: 2020-06-01

## 2020-06-22 ENCOUNTER — COMMUNICATION - HEALTHEAST (OUTPATIENT)
Dept: FAMILY MEDICINE | Facility: CLINIC | Age: 60
End: 2020-06-22

## 2020-06-22 ENCOUNTER — COMMUNICATION - HEALTHEAST (OUTPATIENT)
Dept: SCHEDULING | Facility: CLINIC | Age: 60
End: 2020-06-22

## 2020-06-22 ENCOUNTER — OFFICE VISIT - HEALTHEAST (OUTPATIENT)
Dept: FAMILY MEDICINE | Facility: CLINIC | Age: 60
End: 2020-06-22

## 2020-06-22 DIAGNOSIS — I42.9 CARDIOMYOPATHY (H): ICD-10-CM

## 2020-06-23 ENCOUNTER — AMBULATORY - HEALTHEAST (OUTPATIENT)
Dept: CARDIOLOGY | Facility: CLINIC | Age: 60
End: 2020-06-23

## 2020-06-23 DIAGNOSIS — I44.2 COMPLETE HEART BLOCK (H): ICD-10-CM

## 2020-06-23 DIAGNOSIS — Z95.810 ICD (IMPLANTABLE CARDIOVERTER-DEFIBRILLATOR) IN PLACE: ICD-10-CM

## 2020-06-24 RX ORDER — FUROSEMIDE 40 MG
TABLET ORAL
Qty: 180 TABLET | Refills: 1 | Status: SHIPPED | OUTPATIENT
Start: 2020-06-24

## 2020-07-06 ENCOUNTER — COMMUNICATION - HEALTHEAST (OUTPATIENT)
Dept: CARDIOLOGY | Facility: CLINIC | Age: 60
End: 2020-07-06

## 2020-07-06 DIAGNOSIS — I50.20 HFREF (HEART FAILURE WITH REDUCED EJECTION FRACTION) (H): ICD-10-CM

## 2020-07-06 RX ORDER — SACUBITRIL AND VALSARTAN 97; 103 MG/1; MG/1
TABLET, FILM COATED ORAL
Qty: 180 TABLET | Refills: 0 | Status: SHIPPED | OUTPATIENT
Start: 2020-07-06

## 2020-07-28 ENCOUNTER — COMMUNICATION - HEALTHEAST (OUTPATIENT)
Dept: FAMILY MEDICINE | Facility: CLINIC | Age: 60
End: 2020-07-28

## 2020-07-28 DIAGNOSIS — E11.9 TYPE 2 DIABETES MELLITUS WITHOUT COMPLICATION, WITHOUT LONG-TERM CURRENT USE OF INSULIN (H): ICD-10-CM

## 2020-09-22 ENCOUNTER — AMBULATORY - HEALTHEAST (OUTPATIENT)
Dept: CARDIOLOGY | Facility: CLINIC | Age: 60
End: 2020-09-22

## 2020-09-22 DIAGNOSIS — I44.2 COMPLETE HEART BLOCK (H): ICD-10-CM

## 2020-09-22 DIAGNOSIS — Z95.810 ICD (IMPLANTABLE CARDIOVERTER-DEFIBRILLATOR) IN PLACE: ICD-10-CM

## 2020-11-24 ENCOUNTER — COMMUNICATION - HEALTHEAST (OUTPATIENT)
Dept: FAMILY MEDICINE | Facility: CLINIC | Age: 60
End: 2020-11-24

## 2020-11-24 DIAGNOSIS — E11.9 TYPE 2 DIABETES MELLITUS WITHOUT COMPLICATION, WITHOUT LONG-TERM CURRENT USE OF INSULIN (H): ICD-10-CM

## 2020-12-02 ENCOUNTER — AMBULATORY - HEALTHEAST (OUTPATIENT)
Dept: MULTI SPECIALTY CLINIC | Facility: CLINIC | Age: 60
End: 2020-12-02

## 2020-12-02 LAB — LDLC SERPL CALC-MCNC: 138 MG/DL

## 2020-12-22 ENCOUNTER — AMBULATORY - HEALTHEAST (OUTPATIENT)
Dept: CARDIOLOGY | Facility: CLINIC | Age: 60
End: 2020-12-22

## 2020-12-22 DIAGNOSIS — I44.2 COMPLETE HEART BLOCK (H): ICD-10-CM

## 2020-12-22 DIAGNOSIS — Z95.810 ICD (IMPLANTABLE CARDIOVERTER-DEFIBRILLATOR) IN PLACE: ICD-10-CM

## 2021-01-04 ENCOUNTER — COMMUNICATION - HEALTHEAST (OUTPATIENT)
Dept: FAMILY MEDICINE | Facility: CLINIC | Age: 61
End: 2021-01-04

## 2021-02-09 ENCOUNTER — COMMUNICATION - HEALTHEAST (OUTPATIENT)
Dept: FAMILY MEDICINE | Facility: CLINIC | Age: 61
End: 2021-02-09

## 2021-02-09 DIAGNOSIS — I10 ESSENTIAL HYPERTENSION, BENIGN: ICD-10-CM

## 2021-02-09 DIAGNOSIS — I25.83 CORONARY ARTERY DISEASE DUE TO LIPID RICH PLAQUE: ICD-10-CM

## 2021-02-09 DIAGNOSIS — I21.02 ACUTE ST ELEVATION MYOCARDIAL INFARCTION (STEMI) INVOLVING LEFT ANTERIOR DESCENDING CORONARY ARTERY (H): ICD-10-CM

## 2021-02-09 DIAGNOSIS — I25.5 ISCHEMIC CARDIOMYOPATHY: ICD-10-CM

## 2021-02-09 DIAGNOSIS — I25.10 CORONARY ARTERY DISEASE DUE TO LIPID RICH PLAQUE: ICD-10-CM

## 2021-02-09 RX ORDER — CARVEDILOL 12.5 MG/1
12.5 TABLET ORAL 2 TIMES DAILY WITH MEALS
Qty: 60 TABLET | Refills: 0 | Status: SHIPPED | OUTPATIENT
Start: 2021-02-09

## 2021-05-27 NOTE — TELEPHONE ENCOUNTER
Keep taking the metformin    I sent glipizide 5 two times a day    Walk more    Check back in six wks

## 2021-05-27 NOTE — TELEPHONE ENCOUNTER
"Called and left message for pt to return call.# 3    \" Okay to relay message\"    Are you willing to send a letter to patient?  "

## 2021-05-27 NOTE — TELEPHONE ENCOUNTER
"  CC:  \"Metformin not working\"       > Recent adjustment in metformin to 1000mg two times a day   > AM blood sugar before meals was 250 yesterday   >Today AM blood sugar before meal was 278 and then was 390 after eating      > No other sx of high blood sugar reported - no vomiting no fever        A/P:   > I will message provider for direction - will have them follow up with you tomorrow     > Continue to take meds as directed and check blood sugars as directed       Pt Tele# [phone]    Confirmed pharmacy       Antonio Monzon RN   Triage and Medication Refills      Reason for Disposition    Blood glucose > 240 mg/dl (13 mmol/l)    Protocols used: DIABETES - HIGH BLOOD SUGAR-A-OH      "

## 2021-05-27 NOTE — PROGRESS NOTES
Metformin added since last visit.  One bedtime.   One am one lunch    Sugars pre meal   230-260    Am fasting just below 300    Stroke syndrome denies new numbness or weakness.  Weak on left side    Can walk 1.5 mile    Exercises at home.    Sauna steam  Hyperlipidemia on statin denies muscle pain  Congestive heart failure denies edema, weight gain, nighttime shortness of breath or PND    Coronary disease denies chest pain         OBJECTIVE:   Vitals:    04/10/19 1535   BP: 134/90   Pulse: 80    138/96  Eyes: non icteric, noninflamed  Lungs: no resp distress  Heart: regular  Ankles: no edema  Muscles: nontender  Mental status: euthymic  Neuro: weak left    Body mass index is 27.12 kg/m .     Ambulatory without device or assist  ASSESSMENT/PLAN:  bp high today.   Did not take his Hypertension pill this morning as drove [facility] to here    1. Coronary artery disease due to lipid rich plaque     2. Type 2 diabetes mellitus without complication, without long-term current use of insulin (H)  blood glucose test strips    alcohol swabs PadM    Glycosylated Hemoglobin A1c   3. Pure hypercholesterolemia  LDL Cholesterol, Direct   4. History of stroke with residual deficit left hemiparesis       follow up per result

## 2021-05-28 NOTE — TELEPHONE ENCOUNTER
Call from pt       Did start glipizide (and con't with metformin) as directed but blood sugars continue to be elevated       Yesterday 219   Today 306   (both before meals)      Does note some weakness in his legs but no other sx      He is starting on a low carb diet but worried and would like to be seen by Dr Cook      A/P:  > Appt for tomorrow at clinic     > Continue to monitor at home as before

## 2021-05-28 NOTE — TELEPHONE ENCOUNTER
Pt is calling in to confirm his medications, and message he received from his provider. Relayed message from provider from 4/15/2019. See message. Discussed medications he should be taking. Pt verbalized understanding.     Waldemar Huber RN [facility] Triage/Medication Refill

## 2021-05-30 ENCOUNTER — RECORDS - HEALTHEAST (OUTPATIENT)
Dept: ADMINISTRATIVE | Facility: CLINIC | Age: 61
End: 2021-05-30

## 2021-05-30 VITALS — HEIGHT: 64 IN | BODY MASS INDEX: 25.27 KG/M2 | WEIGHT: 148 LBS

## 2021-05-30 VITALS — BODY MASS INDEX: 25.92 KG/M2 | WEIGHT: 151 LBS

## 2021-05-30 VITALS — BODY MASS INDEX: 25.78 KG/M2 | HEIGHT: 64 IN | WEIGHT: 151 LBS

## 2021-05-30 NOTE — TELEPHONE ENCOUNTER
Left message asking patient to return call to discuss Dr. Perez's recommendation for clinic visit and labs.     Darnell Reaves RN

## 2021-05-30 NOTE — TELEPHONE ENCOUNTER
Left message for patient to return call to discuss Dr. Perez's recommendations: OV, labs for K and mg.     Darnell Reaves, RN BSN  [facility]

## 2021-05-30 NOTE — TELEPHONE ENCOUNTER
Refill Approved    Rx renewed per Medication Renewal Policy. Medication was last renewed on 3/19/2019.    Waldemar Huber, [facility] Triage/Med Refill 7/7/2019     Requested Prescriptions   Pending Prescriptions Disp Refills     furosemide (LASIX) 40 MG tablet [Pharmacy Med Name: FUROSEMIDE 40 MG TAB 40 TAB] 90 tablet 3     Sig: TAKE 1 PILL BY MOUTH EVERY DAY/TXHUA HNUB NOJ 1 LUB TSHUAJ PAB SHEA PHOB VOG       Diuretics/Combination Diuretics Refill Protocol  Passed - 7/6/2019 11:08 AM        Passed - Visit with PCP or prescribing provider visit in past 12 months     Last office visit with prescriber/PCP: 4/10/2019 Eris Cook MD OR same dept: 4/10/2019 Eris Cook MD OR same specialty: 4/10/2019 Eris Cook MD  Last physical: 12/10/2018 Last MTM visit: Visit date not found   Next visit within 3 mo: Visit date not found  Next physical within 3 mo: Visit date not found  Prescriber OR PCP: Eris Cook MD  Last diagnosis associated with med order: 1. Cardiomyopathy (H)  - furosemide (LASIX) 40 MG tablet [Pharmacy Med Name: FUROSEMIDE 40 MG TAB 40 TAB]; TAKE 1 PILL BY MOUTH EVERY DAY/TXHUA HNUB NOJ 1 LUB TSHUAJ PAB SHEA PHOB VOG  Dispense: 90 tablet; Refill: 3    If protocol passes may refill for 12 months if within 3 months of last provider visit (or a total of 15 months).             Passed - Serum Potassium in past 12 months      Lab Results   Component Value Date    Potassium 3.6 06/19/2019             Passed - Serum Sodium in past 12 months      Lab Results   Component Value Date    Sodium 141 06/19/2019             Passed - Blood pressure on file in past 12 months     BP Readings from Last 1 Encounters:   06/19/19 116/80             Passed - Serum Creatinine in past 12 months      Creatinine   Date Value Ref Range Status   06/19/2019 0.79 0.70 - 1.30 mg/dL Final

## 2021-05-31 VITALS — WEIGHT: 146 LBS | BODY MASS INDEX: 25.06 KG/M2

## 2021-05-31 VITALS — WEIGHT: 158 LBS | BODY MASS INDEX: 27.12 KG/M2

## 2021-05-31 NOTE — TELEPHONE ENCOUNTER
Refill Approved    Rx renewed per Medication Renewal Policy. Medication was last renewed on 12/11/18.    Sruy Dejesus, [facility] Triage/Med Refill 8/9/2019     Requested Prescriptions   Pending Prescriptions Disp Refills     atorvastatin (LIPITOR) 80 MG tablet [Pharmacy Med Name: ATORVASTATIN CALCIUM 80 MG 80 TAB] 90 tablet 1     Sig: TAKE 1 TABLET (80 MG TOTAL) BY MOUTH AT BEDTIME./ [facility] NOJ 1 ENEDELIA MACIEL       Statins Refill Protocol (Hmg CoA Reductase Inhibitors) Passed - 8/8/2019  9:42 AM        Passed - PCP or prescribing provider visit in past 12 months      Last office visit with prescriber/PCP: 4/10/2019 Eris Cook MD OR same dept: Visit date not found OR same specialty: Visit date not found  Last physical: 12/10/2018 Last MTM visit: Visit date not found   Next visit within 3 mo: Visit date not found  Next physical within 3 mo: Visit date not found  Prescriber OR PCP: Eris Cook MD  Last diagnosis associated with med order: 1. Hyperlipidemia  - atorvastatin (LIPITOR) 80 MG tablet [Pharmacy Med Name: ATORVASTATIN CALCIUM 80 MG 80 TAB]; TAKE 1 TABLET (80 MG TOTAL) BY MOUTH AT BEDTIME./ [facility] NOJ 1 ENEDELIA MACIEL  Dispense: 90 tablet; Refill: 1    If protocol passes may refill for 12 months if within 3 months of last provider visit (or a total of 15 months).

## 2021-06-01 VITALS — HEIGHT: 64 IN | WEIGHT: 154 LBS | BODY MASS INDEX: 26.29 KG/M2

## 2021-06-01 VITALS — WEIGHT: 156 LBS | BODY MASS INDEX: 26.63 KG/M2 | HEIGHT: 64 IN

## 2021-06-01 VITALS — BODY MASS INDEX: 27.15 KG/M2 | WEIGHT: 158.2 LBS

## 2021-06-01 VITALS — BODY MASS INDEX: 26.43 KG/M2 | WEIGHT: 154 LBS

## 2021-06-02 VITALS — WEIGHT: 157 LBS | BODY MASS INDEX: 26.8 KG/M2 | HEIGHT: 64 IN

## 2021-06-02 VITALS — BODY MASS INDEX: 27.12 KG/M2 | WEIGHT: 158 LBS

## 2021-06-02 VITALS — HEIGHT: 64 IN | WEIGHT: 158 LBS | BODY MASS INDEX: 26.98 KG/M2

## 2021-06-03 VITALS — WEIGHT: 149 LBS | HEIGHT: 64 IN | BODY MASS INDEX: 25.44 KG/M2

## 2021-06-03 VITALS — BODY MASS INDEX: 27.12 KG/M2 | WEIGHT: 158 LBS

## 2021-06-04 NOTE — TELEPHONE ENCOUNTER
Refill Approved    Rx renewed per Medication Renewal Policy. Medication was last renewed on 7/7/19.    Deepti Vasquez, [facility] Triage/Med Refill 12/31/2019     Requested Prescriptions   Pending Prescriptions Disp Refills     furosemide (LASIX) 40 MG tablet [Pharmacy Med Name: FUROSEMIDE 40MG TABLETS] 90 tablet 0     Sig: TAKE 1 TABLET BY MOUTH EVERY DAY       Diuretics/Combination Diuretics Refill Protocol  Passed - 12/31/2019  5:49 AM        Passed - Visit with PCP or prescribing provider visit in past 12 months     Last office visit with prescriber/PCP: 4/10/2019 Eris Cook MD OR same dept: 4/10/2019 Eris Cook MD OR same specialty: 4/10/2019 Eris Cook MD  Last physical: 12/10/2018 Last MTM visit: Visit date not found   Next visit within 3 mo: Visit date not found  Next physical within 3 mo: Visit date not found  Prescriber OR PCP: Eris Cook MD  Last diagnosis associated with med order: There are no diagnoses linked to this encounter.  If protocol passes may refill for 12 months if within 3 months of last provider visit (or a total of 15 months).             Passed - Serum Potassium in past 12 months      Lab Results   Component Value Date    Potassium 3.6 06/19/2019             Passed - Serum Sodium in past 12 months      Lab Results   Component Value Date    Sodium 141 06/19/2019             Passed - Blood pressure on file in past 12 months     BP Readings from Last 1 Encounters:   06/19/19 116/80             Passed - Serum Creatinine in past 12 months      Creatinine   Date Value Ref Range Status   06/19/2019 0.79 0.70 - 1.30 mg/dL Final

## 2021-06-04 NOTE — TELEPHONE ENCOUNTER
Spoke to patient, he moved permanent to [place], [place]. Recommended he establish with a cardiologist and Device Clinic there ASAP. He said would do that. Jerrod entered. KIRSTY

## 2021-06-04 NOTE — TELEPHONE ENCOUNTER
Reviewed event with patient and he was asymptomatic. He states he was sleeping in bed at the time. He denies any symptoms and actually states he is feeling great. He confirms he is taking all his medications as prescribed and has not missed any doses. Patient was upgraded from a pacemaker to an ICD in 2017 at [facility] s/p cardiac arrest.     He woke up yesterday morning was asymptomatic. He denies shortness of breath, dizziness, syncope, chest pain and near syncope.     Routing to Dr. Perez.   Heather Mckeon RN BSN  [facility]

## 2021-06-07 NOTE — TELEPHONE ENCOUNTER
Please click refuse and route it back to your care team pool so that when we do get a hold of the pt we can sign and close the encounter.

## 2021-06-07 NOTE — TELEPHONE ENCOUNTER
Called and left detailed message. Completing task. If pt calls back please help schedule a telephone visit to go over medications with PCP

## 2021-06-07 NOTE — PROGRESS NOTES
"Pablito Hurley is a 59 y.o. male who is being evaluated via a billable telephone visit.      The patient has been notified of following:     \"This telephone visit will be conducted via a call between you and your physician/provider. We have found that certain health care needs can be provided without the need for a physical exam.  This service lets us provide the care you need with a short phone conversation.  If a prescription is necessary we can send it directly to your pharmacy.  If lab work is needed we can place an order for that and you can then stop by our lab to have the test done at a later time.    Telephone visits are billed at different rates depending on your insurance coverage. During this emergency period, for some insurers they may be billed the same as an in-person visit.  Please reach out to your insurance provider with any questions.    If during the course of the call the physician/provider feels a telephone visit is not appropriate, you will not be charged for this service.\"    Patient has given verbal consent to a Telephone visit? Yes    Patient would like to receive their AVS by AVS Preference: Mail a copy.    Additional provider notes:     Assessment/Plan:        Phone call duration:  22 minutes    Eris Cook MD    11:27 AM     Needs refills.    Ran out januvia one week.    Living in [place] until COVID 19   Then will return to [place].    Staying with daughter and son in law.   Mother passed at 80 2016.  300 hundred.    januvia 100  Coreg  Glyburide 5    Numbers 100 fasting    Gets exercise 10min per day    Wt 140  Sleep  No shortness of breath    Ros no fever no cough no myalgias    Coronary disease denies chest pain  Congestive heart failure denies edema, weight gain, nighttime shortness of breath or PND  Hyperlipidemia on statin denies muscle pain  Diabetes denies polyuria.  Hypertension denies chest pain or headache.    ASSESSMENT/PLAN:    Stable chronic issues.  Isolating in [place] " [place] with son and daughter in law.  Unable to come to [facility] for labs.  Expecting to when COVID loosens up.    Will refill meds and pt to come for labs when able    1. Benign Essential Hypertension  carvediloL (COREG) 12.5 MG tablet   2. Coronary artery disease due to lipid rich plaque  carvediloL (COREG) 12.5 MG tablet   3. Pure hypercholesterolemia     4. Acute ST elevation myocardial infarction (STEMI) involving left anterior descending coronary artery (H)  carvediloL (COREG) 12.5 MG tablet   5. Ischemic cardiomyopathy  carvediloL (COREG) 12.5 MG tablet   6. Type 2 diabetes mellitus without complication, without long-term current use of insulin (H)  glyBURIDE (DIABETA) 5 MG tablet    SITagliptin (JANUVIA) 100 MG tablet       Chronic issues stable/ same treatment  Current Outpatient Medications on File Prior to Visit   Medication Sig Dispense Refill     alcohol swabs (BD ALCOHOL SWABS) PadM USE THREE TIMES DAILY AS DIRECTED 100 each 2     atorvastatin (LIPITOR) 80 MG tablet TAKE 1 TABLET (80 MG TOTAL) BY MOUTH AT BEDTIME./ TXA O THAUM MUS PW NOJ 1 LUB PAB SHEA NTSHAV HAKAN ROJ 90 tablet 1     blood glucose test strips Three times a day 100 strip 11     clopidogrel (PLAVIX) 75 mg tablet Take 1 tablet (75 mg total) by mouth daily. 30 tablet 0     ENTRESTO  mg Tab tablet TAKE 1 PILL BY MOUTH 2 TIMES EVERYDAY/ TXA HNUB NOJ 1 LUB 2 ZAUG 180 tablet 0     furosemide (LASIX) 40 MG tablet TAKE 1 PILL BY MOUTH EVERY DAY/TXA UB NOJ 1 LUB TSHUAJ PAB SHEA PHOB VOG 90 tablet 2     glipiZIDE (GLUCOTROL) 5 MG tablet Take 1 tablet (5 mg total) by mouth 2 (two) times a day before meals. 1/2 Hour BEFORE meals 180 tablet 0     ONETOUCH DELICA LANCETS 33 gauge Misc USE UTD EVERY DAY FOR BLOOD SUGAR TESTING  3     [DISCONTINUED] carvedilol (COREG) 12.5 MG tablet Take 1 tablet (12.5 mg total) by mouth 2 (two) times a day with meals. Over due to see Dr. Marcelo 60 tablet 0     [DISCONTINUED] furosemide (LASIX) 40 MG tablet  TAKE 1 PILL BY MOUTH EVERY DAY TXHUA HNUB NOJ 1 LUB TSHUAJ PAB SHEA PHOB VOG  3     [DISCONTINUED] glyBURIDE (DIABETA) 5 MG tablet Take one tablet daily       metFORMIN (GLUCOPHAGE) 1000 MG tablet Take 1 tablet (1,000 mg total) by mouth 2 (two) times a day with meals. 180 tablet 3     [DISCONTINUED] furosemide (LASIX) 40 MG tablet Take 1 tablet (40 mg total) by mouth daily. 90 tablet 0     No current facility-administered medications on file prior to visit.         11:49 AM

## 2021-06-07 NOTE — TELEPHONE ENCOUNTER
"Called and left message for pt to return call.# 1  \" Okay to relay message\"  Upon call back please help patient make virtual visit with Dr. Cook to go over medications.   "

## 2021-06-08 NOTE — TELEPHONE ENCOUNTER
Refill Request  Did you contact pharmacy: Yes  Medication name:   Requested Prescriptions     Pending Prescriptions Disp Refills     blood glucose test strips 100 strip 11     Sig: Three times a day     Who prescribed the medication: pcp  Requested Pharmacy: [facility] stated he is out of medication and needs asap  Is patient out of medication: Yes  Patient notified refills processed in 3 business days:  yes  Okay to leave a detailed message: yes

## 2021-06-08 NOTE — TELEPHONE ENCOUNTER
RN cannot approve Refill Request    RN can NOT refill this medication Protocol failed and NO refill given.     Jennifer Caldwell, [facility] Triage/Med Refill 5/7/2020    Requested Prescriptions   Pending Prescriptions Disp Refills     blood glucose test strips 100 strip 11     Sig: Three times a day       Diabetic Supplies Refill Protocol Failed - 5/7/2020 11:34 AM        Failed - Visit with PCP or prescribing provider visit in last 6 months     Last office visit with prescriber/PCP: 4/10/2019 Eris Cook MD OR same dept: Visit date not found OR same specialty: Visit date not found  Last physical: 12/10/2018 Last MTM visit: Visit date not found   Next visit within 3 mo: Visit date not found  Next physical within 3 mo: Visit date not found  Prescriber OR PCP: Eris Cook MD  Last diagnosis associated with med order: 1. Type 2 diabetes mellitus without complication, without long-term current use of insulin (H)  - blood glucose test strips; Three times a day  Dispense: 100 strip; Refill: 11    If protocol passes may refill for 12 months if within 3 months of last provider visit (or a total of 15 months).             Failed - A1C in last 6 months     Hemoglobin A1c   Date Value Ref Range Status   04/10/2019 9.0 (H) 3.5 - 6.0 % Final

## 2021-06-09 NOTE — TELEPHONE ENCOUNTER
Shortness of breath   Patient states for 1 week he has had shortness of breath.  When sleeping , and when breathing, I'm shortness of breath.      Patient does not want to make IN PERSON APPOINTMENT>    Telephone appointment made today at 3:40pm  With Dr. Eris Cook.    Jen Linares RN  [facility] Triage/refill nurse    Reason for Disposition    Patient wants to be seen    Protocols used: BREATHING DIFFICULTY-A-OH

## 2021-06-09 NOTE — PROGRESS NOTES
"Pablito Hurley is a 60 y.o. male who is being evaluated via a billable telephone visit.      The patient has been notified of following:     \"This telephone visit will be conducted via a call between you and your physician/provider. We have found that certain health care needs can be provided without the need for a physical exam.  This service lets us provide the care you need with a short phone conversation.  If a prescription is necessary we can send it directly to your pharmacy.  If lab work is needed we can place an order for that and you can then stop by our lab to have the test done at a later time.    Telephone visits are billed at different rates depending on your insurance coverage. During this emergency period, for some insurers they may be billed the same as an in-person visit.  Please reach out to your insurance provider with any questions.    If during the course of the call the physician/provider feels a telephone visit is not appropriate, you will not be charged for this service.\"    Patient has given verbal consent to a Telephone visit? Yes    What phone number would you like to be contacted at? [phone]     Patient would like to receive their AVS by AVS Preference: Mail a copy.    Additional provider notes:     Assessment/Plan:        Phone call duration:  18 minutes    Te PrincePABLO     3:51 PM   Describes white people accusing [place] of [place] to [place].  complains of shortness of breath with walk, sleep    Every day.  Needs different water pill.   Does not seem to work.    Ankles swelled.  145 up from 143.    2-3 days.    Cannot sleep flat.   Able to walk less than a mile.  Legs seem weaker.      cva left sided weakness.      Last year similar problems with left sided swelling.      Late in follow up card  ASSESSMENT/PLAN:    chf worsening sxs/   Three times a day furosemide 3 days then two times a day.  Expect diuresis and then return to baseline sx and meds.   If not, then ED    Will " assist in getting follow up appt with card    1. Cardiomyopathy (H)  furosemide (LASIX) 40 MG tablet    Ambulatory referral to Cardiology     Problem number of new, unstable, or uncontrolled problems above (others are stable):  Chronic issues stable/ same treatment  Current Outpatient Medications on File Prior to Visit   Medication Sig Dispense Refill     alcohol swabs (BD ALCOHOL SWABS) PadM USE THREE TIMES DAILY AS DIRECTED 100 each 2     atorvastatin (LIPITOR) 80 MG tablet TAKE 1 TABLET (80 MG TOTAL) BY MOUTH AT BEDTIME./ TXHUA HMO THAUM MUS PW NOJ 1 LUB PAB SHEA NTSHAV HAKAN ROJ 90 tablet 1     blood glucose test strips Three times a day 100 strip 11     carvediloL (COREG) 12.5 MG tablet Take 1 tablet (12.5 mg total) by mouth 2 (two) times a day with meals. Over due to see Dr. Marcelo 180 tablet 1     clopidogrel (PLAVIX) 75 mg tablet Take 1 tablet (75 mg total) by mouth daily. 30 tablet 0     ENTRESTO  mg Tab tablet TAKE 1 PILL BY MOUTH 2 TIMES EVERYDAY/ TXHUA HNUB NOJ 1 LUB 2 ZAUG 180 tablet 0     glipiZIDE (GLUCOTROL) 5 MG tablet Take 1 tablet (5 mg total) by mouth 2 (two) times a day before meals. 1/2 Hour BEFORE meals 180 tablet 0     glyBURIDE (DIABETA) 5 MG tablet Take 1 tablet (5 mg total) by mouth daily with breakfast. Take one tablet daily 90 tablet 1     ONETOUCH DELICA LANCETS 33 gauge Misc USE UTD EVERY DAY FOR BLOOD SUGAR TESTING  3     SITagliptin (JANUVIA) 100 MG tablet Take 1 tablet (100 mg total) by mouth daily. With or without food 90 tablet 1     [DISCONTINUED] furosemide (LASIX) 40 MG tablet TAKE 1 PILL BY MOUTH EVERY DAY/TXHUA HNUB NOJ 1 LUB TSHUAJ PAB SHEA PHOB VOG 90 tablet 2     metFORMIN (GLUCOPHAGE) 1000 MG tablet Take 1 tablet (1,000 mg total) by mouth 2 (two) times a day with meals. 180 tablet 3     No current facility-administered medications on file prior to visit.

## 2021-06-09 NOTE — PROGRESS NOTES
Sob since pacemaker put in.  One year.  Since pacemaker. Can walk one mile.  Sometimes two miles.      But after coffee/ croissant feels like breathing is bad.  Breathe faster. Hard to breathe.  Last four days.  When this happens, increases the water pill to 3/d from one per day.  This relieves the pressure.  Discussed with Dr. Nuñez.  Was told to keep taking the water pill to keep the wt down.  Dr. Nuñez didn't worry about it.   During this time is still able to walk 1-2 miles.    Says neighbor had it too and it was adjusted.  Pt thinks pacemaker needs adjustment.    Hypertension denies chest pain or headache.    Stroke syndrome denies new numbness or weakness.  Hyperlipidemia on statin denies muscle pain  ROS: as noted above    OBJECTIVE:   Vitals:    03/27/17 1116   BP: (!) 126/92   Pulse: 71   Resp: 20   Temp: (!) 96.5  F (35.8  C)   SpO2: 97%    speaks loudly  Head: atraumatic   Eyes: nl eom, anicteric   Ears: nl external ears   Neck: nl nodes, supple   Lungs: clear to ausc   Heart: regular rhythm  Back: no tenderness  Abd: soft nontender   Joints: uninflamed   Ext: nontender calves   Mental: euthymic  Neuro: L weakness  Gait: slow, independent hemiparetic with cane      Card records reviewed.  A year ago 35% EF and was to follow up to consider upgrading pacer.  Last fall mi and pci and discussions re follow up echo but did not happen    ASSESSMENT/PLAN:  Reconnect with cardiology  1. Cardiomyopathy  Echo Complete    Ambulatory referral to Cardiology   2. Pure hypercholesterolemia     3. History of stroke with residual deficit left hemiparesis     4. Benign Essential Hypertension

## 2021-06-09 NOTE — PROGRESS NOTES
Assessment/Plan:      Visit for Preoperative Exam.   1. Pre-op examination  2. CAD (coronary artery disease)  3. Benign Essential Hypertension  4. Pure hypercholesterolemia  5. Stroke Syndrome  6. Type 2 diabetes mellitus without complication  Well appearing.   Blood pressure stable. Diabetes well controlled. Working with cardiology s/p pacemaker.   Cleared for surgery.   Advised to follow up with PCP for ongoing care of chronic medical problems.     Patient approved for surgery with general or local anesthesia. Postoperative Care will be managed by Hospital Service. Above recommendations were reviewed with the patient. Low Risk Surgery.     Subjective:     Scheduled Procedure: Cataracts   Surgery Date:  03/15/17 and also in April  Surgery Location:  [facility]  Surgeon:  Dr. Antonio MILLER  [age] year old male here for pre op evaluation.   Reports history of pacemaker placement. Has history of NSTEMI January 2016, third degree AV block. Last admitted to hospital in November, NSTEMI. Is well since then.    Reports he is following with cardiologist.   He denies chest pain, shortness of breath, leg swelling. Tolerating diet.   History of cataracts.   No recent illness or hospitalization.   Taking his medications as directed.   History, past surgical, family history, medications reconciled.     Current Outpatient Prescriptions   Medication Sig Dispense Refill     atorvastatin (LIPITOR) 80 MG tablet Take 1 tablet (80 mg total) by mouth bedtime. TXHUA HMO THAUM MUS PW NOJ 1 LUB PAB SHEA NTSHAV HAKAN ROJ 90 tablet 1     carvedilol (COREG) 6.25 MG tablet Take 1 tablet (6.25 mg total) by mouth 2 (two) times a day. NOJ 1 LUB TSHUAJ OB ZAUG IB HNUB PAB SHEA NTSHAV SIAB 180 tablet 1     cloNIDine HCl (CATAPRES) 0.1 MG tablet TAKE 1 PILL BY MOUTH 2 TIMES EVERYDAY/ TXHUA HNUB NOJ 1 LUB 2 ZAUG PAB SHEA NTSHAV SIAB 60 tablet 0     clopidogrel (PLAVIX) 75 mg tablet Take 1 tablet (75 mg total) by mouth daily. 30 tablet 1      furosemide (LASIX) 40 MG tablet TAKE 1 PILL BY MOUTH EVERY DAY/TXHUA HNUB NOJ 1 LUB TSHUAJ PAB SHEA PHOB VOG 30 tablet 10     losartan (COZAAR) 50 MG tablet Take 1 tablet (50 mg total) by mouth daily. 90 tablet 5     metFORMIN (GLUCOPHAGE) 850 MG tablet TAKE 1 PILL BY MOUTH TWICE DAILY WITH MEALS/ TXHUA HNUB NOJ 1 LUB 2 ZAUG NROG MOV PAB NTSHAV QAB  tablet 1     warfarin (COUMADIN) 1 MG tablet Take 1 tablets (1mg) every day except for Mondays and Fridays, on Mondays and Fridays take 2 tablets (2mg). 90 tablet 0     prednisoLONE acetate (PRED-FORTE) 1 % ophthalmic suspension   1     VIGAMOX 0.5 % ophthalmic solution   1     No current facility-administered medications for this visit.        Allergies   Allergen Reactions     Ace Inhibitors Cough       Immunization History   Administered Date(s) Administered     Hep A, historic 06/13/2007     Influenza, inj, historic 02/13/2014     Influenza, seasonal,quad inj 36+ mos 11/19/2014     Influenza,seasonal quad, PF, 36+MOS 02/02/2016, 11/26/2016     Td, historic 10/12/2006     Tdap 10/12/2006       Patient Active Problem List   Diagnosis     Vitamin D deficiency     Appetite Lost (Anorexia)     Metabolic Tests Nonspecific Abnormal Serum Enzyme Levels     Acute Otitis Externa Of The Right Ear     Acute Bronchitis With Bronchospasm     Chronic Otitis Externa Of The Right Ear     Benign Essential Hypertension     Hematuria     Frequent, Full-bladder Emptying (Polyuria)     Pure hypercholesterolemia     History of stroke with residual deficit left hemiparesis     Gastritis Due To H. Pylori     Edema     Glycosuria     Candidal Balanitis     Otitis Media     Type 2 diabetes mellitus without complication     Abnormal Weight Loss     Acute ST elevation myocardial infarction (STEMI) involving left anterior descending coronary artery     Coronary artery disease due to lipid rich plaque     Cardiomyopathy     Complete heart block     Pneumonia     h/o Ventricular mural  thrombus     STEMI (ST elevation myocardial infarction)       Past Medical History:   Diagnosis Date     Benign essential hypertension      Cardiac pacemaker in situ      Coronary artery disease due to lipid rich plaque 1/29/2016     Dyslipidemia      h/o Ventricular mural thrombus 7/18/2016     History of stroke with residual deficit left hemiparesis     Created by Conversion      Type 2 diabetes mellitus        Social History     Social History     Marital status:      Spouse name: N/A     Number of children: 6     Years of education: N/A     Occupational History     Not on file.     Social History Main Topics     Smoking status: Never Smoker     Smokeless tobacco: Not on file     Alcohol use No     Drug use: No     Sexual activity: Not on file     Other Topics Concern     Not on file     Social History Narrative       Past Surgical History:   Procedure Laterality Date     cardiac pacemaker  02/01/2016     Recent Health  Fever: no  Chills: no  Fatigue: no  Chest Pain: no  Cough: no  Dyspnea: no  Urinary Frequency: no  Nausea: no  Vomiting: no  Diarrhea: no  Abdominal Pain: no  Easy Bruising: no  Lower Extremity Swelling: no  Poor Exercise Tolerance: no    Most recent Health Maintenance Visit:  1 year(s) ago    Pertinent History  Prior Anesthesia: yes  Previous Anesthesia Reaction: denies  Diabetes: yes well controlled  Cardiovascular Disease: CAD, third degree av block s/p pacemaker  Pulmonary Disease: no  Renal Disease: no  GI Disease: no  Sleep Apnea: no  Thromboembolic Problems: yes  Clotting Disorder: yes, years ago  Bleeding Disorder: no  Transfusion Reaction: no  Impaired Immunity: no  Steroid use in the last 6 months: no  Frequent Aspirin use: no    Family history of none    Social history of patient does not wear denture or partial plates, there is no transfusion refusal and there are no concerns regarding care after surgery    After surgery, the patient plans to recover at home with  "family.    Review of Systems  A 12 point comprehensive review of systems was negative except as noted.          Objective:         Vitals:    03/14/17 1248   BP: 123/86   Pulse: 80   Temp: 98.1  F (36.7  C)   TempSrc: Oral   Weight: 148 lb (67.1 kg)   Height: 5' 4\" (1.626 m)       Physical Exam:  General Appearance: Alert, cooperative, no distress, appears stated age  Head: Normocephalic, without obvious abnormality, atraumatic  Eyes: PERRL, conjunctiva/corneas clear, EOM's intact  Ears: Normal TM's and external ear canals, both ears  Nose: Nares normal, septum midline,mucosa normal, no drainage  Throat: Lips, mucosa, and tongue normal; teeth and gums normal  Neck: Supple, symmetrical, trachea midline, no adenopathy;  thyroid: not enlarged, symmetric, no tenderness/mass/nodules; no carotid bruit or JVD  Lungs: Clear to auscultation bilaterally, respirations unlabored  Heart: Regular rate and rhythm, S1 and S2 normal, no murmur, rub, or gallop,  Abdomen: Soft, non-tender, bowel sounds active all four quadrants  Genitourinary:deferred  Musculoskeletal: Normal range of motion. No joint swelling or deformity.   Extremities: Extremities normal, atraumatic, no cyanosis or edema  Skin: Skin color, texture, turgor normal, no rashes or lesions  Lymph nodes: Cervical, supraclavicular nodes normal  Neurologic: He is alert. No changes to baseline left hemiparesis.   Psychiatric: He has a normal mood and affect.        "

## 2021-06-10 NOTE — TELEPHONE ENCOUNTER
Relayed message to patient. He verbalized understanding of plan and will contact once medications are done. Completing task.

## 2021-06-12 NOTE — PROGRESS NOTES
Says meds are correct    Walks about a mile.   Rests five minutes.  Walks with cane    Stroke syndrome denies new numbness or weakness.    Cardiomyopathy.  Last  Echo was at [facility].  This after new pacemaker.  April echo 20 %    VT arrest.  STEMI anterior.  Ischemic cardiomyopathy.  [facility]    Sleep fine.  No sob.  Neg pnd.  No edema.  No chest pain.  No tachy.    Diabetes denies polyuria.  Hyperlipidemia on statin denies muscle pain  Fasting 70    Pain slight.  comes and goes   With fluid.  [place] to clean. Noticed when sleep      Last week [facility] gtt[facility] from [facility].  Known perf    ROS: as noted above    OBJECTIVE:   Vitals:    07/26/17 1427   BP: 104/74   Pulse: 72   Resp: 20   Temp: 98.5  F (36.9  C)    speaks english loudly  Right tm eryth and small perf.  Not wet  Eyes: non icteric, noninflamed  Lungs: no resp distress  Heart: regular  Ankles: no edema  Muscles: nontender  Mental status: euthymic  Neuro: left weakness    ASSESSMENT/PLAN:    1. Otitis media  neomycin-polymyxin-hydrocortisone (CORTISPORIN) otic solution   2. Tympanic membrane perforation, right  neomycin-polymyxin-hydrocortisone (CORTISPORIN) otic solution   3. History of stroke with residual deficit left hemiparesis     4. Coronary artery disease due to lipid rich plaque     5. Benign Essential Hypertension  Comprehensive Metabolic Panel   6. Pure hypercholesterolemia  LDL Cholesterol, Direct    Comprehensive Metabolic Panel   7. Type 2 diabetes mellitus without complication, without long-term current use of insulin  Glycosylated Hemoglobin A1c   8. Subacute otitis media of right ear  neomycin-polymyxin-hydrocortisone (CORTISPORIN) otic solution   9. Immunization due  Td, Adult, Adsorbed   10. Cardiomyopathy         Chronic issues stable/ same treatment.   follow up couple months or earlier per lab    More than 25 of 40 minutes total time spent education counseling regarding the issues and care of same as listed in the assessment and plan  of this note

## 2021-06-14 NOTE — TELEPHONE ENCOUNTER
Received med refill request for Carvedilol for patient. Patient needs to establish care with new provider. Please call to help schedule.

## 2021-06-15 NOTE — PROGRESS NOTES
Pablito came in to see MANUEL today. He brought in needed proofs. MANUEL made copies and helped him complete assistance forms for [facility] and [facility]. MANUEL stabled them all together and he has stamps and manila envelopes at home to mail them in.

## 2021-06-15 NOTE — TELEPHONE ENCOUNTER
Spoke with pt @ [phone] relayed message to pt, pt stated pt already established care with a provider in [facility], the new provider already refill his medication. Changed PCP to No PCP.

## 2021-06-15 NOTE — PROGRESS NOTES
He has a lot of medical bills. He is too young for special populations MAFarrukh JACKSON gave him the list of proofs required for [facility] and [facility] financial assistance programs. He will return on 1/23/18 so we can finish the applications

## 2021-06-15 NOTE — PROGRESS NOTES
Not taking water pill.  Is taking an herbal for heart failure.  Is talking better.  Walking golf course out and back during summer.   Now winter at home on bike.  No stairs.  Sleep well.  No sob. No edema no weight gain.     Was given new defibrillator.  Battery to last 30 years.    Herb cost is 25 /d wk.   Was told heart transplant at [facility] but pt declined and wanted herbs instead.   Pt says is doing well    Hypertension is high and taking the med and not working.  154/102    Had cataracts done and sees much better.    Stroke syndrome denies new numbness or weakness.  Left side is weak.  No longer uses cane.    Hyperlipidemia on statin denies muscle pain  Hypertension denies chest pain or headache.     New meds am  plavix 75  Furosemide 40  Losartan 100       Hs ator 80    Last wk bg 75    No diabetes mellitus meds.    Right ear infection last week and given antibiotic    Sees [facility] card every three months.  Has labs there.    ROS: as noted above    OBJECTIVE:   Vitals:    12/29/17 1514   BP: 136/82   Pulse:    Resp:    SpO2:       Wt is noted.  No diaphoresis  Eyes: nl eom, anicteric   External ears, nose: nl    Neck: nl nodes, supple, thyroid normal   Lungs: clear to ausc   Heart: regular rhythm  Abd: soft nontender   No cva (renal) tenderness  Neuro: left weakness  Skin no rash  Joints: uninflamed   No ketotic breath odor noted  Mental: euthymic  Ext: nontender calves   Gait: left hemiparetic  Bmi:27    A1c 6.1    ASSESSMENT/PLAN:    Additional diagnoses and related orders:  1. Type 2 diabetes mellitus without complication  Glycosylated Hemoglobin A1c   2. History of stroke with residual deficit left hemiparesis     3. Coronary artery disease due to lipid rich plaque     4. Benign Essential Hypertension     5. Pure hypercholesterolemia       Chronic issues stable/ same treatment.   follow up 3 months  Will be following with [facility]

## 2021-06-15 NOTE — TELEPHONE ENCOUNTER
Please contact this patient, let him know Dr. Cook has retired    He needs to establish with a new physician, virtual visit or face-to-face.    Let him know he only got 1 month supply of his medication

## 2021-06-15 NOTE — TELEPHONE ENCOUNTER
RN cannot approve Refill Request    RN can NOT refill this medication No established PCP. Last office visit: 4/10/2019 Eris Cook MD Last Physical: 12/10/2018 Last MTM visit: Visit date not found Last visit same specialty: 4/10/2019 Eris Cook MD.  Next visit within 3 mo: Visit date not found  Next physical within 3 mo: Visit date not found      Batool Patel, [facility] Triage/Med Refill 2/9/2021    Requested Prescriptions   Pending Prescriptions Disp Refills     carvediloL (COREG) 12.5 MG tablet 180 tablet 1     Sig: Take 1 tablet (12.5 mg total) by mouth 2 (two) times a day with meals. Over due to see Dr. Marcelo       Beta-Blockers Refill Protocol Failed - 2/9/2021  8:50 AM        Failed - Blood pressure filed in past 12 months     BP Readings from Last 1 Encounters:   06/19/19 116/80             Passed - PCP or prescribing provider visit in past 12 months or next 3 months     Last office visit with prescriber/PCP: 4/10/2019 Eris Cook MD OR same dept: Visit date not found OR same specialty: 4/10/2019 Eris Cook MD  Last physical: 12/10/2018 Last MTM visit: Visit date not found   Next visit within 3 mo: Visit date not found  Next physical within 3 mo: Visit date not found  Prescriber OR PCP: Eris Cook MD  Last diagnosis associated with med order: 1. Coronary artery disease due to lipid rich plaque  - carvediloL (COREG) 12.5 MG tablet; Take 1 tablet (12.5 mg total) by mouth 2 (two) times a day with meals. Over due to see Dr. Marcelo  Dispense: 180 tablet; Refill: 1    2. Benign Essential Hypertension  - carvediloL (COREG) 12.5 MG tablet; Take 1 tablet (12.5 mg total) by mouth 2 (two) times a day with meals. Over due to see Dr. Marcelo  Dispense: 180 tablet; Refill: 1    3. Acute ST elevation myocardial infarction (STEMI) involving left anterior descending coronary artery (H)  - carvediloL (COREG) 12.5 MG tablet; Take 1 tablet (12.5 mg total) by mouth 2 (two) times a day with meals. Over due to see   [facility]  Dispense: 180 tablet; Refill: 1    4. Ischemic cardiomyopathy  - carvediloL (COREG) 12.5 MG tablet; Take 1 tablet (12.5 mg total) by mouth 2 (two) times a day with meals. Over due to see Dr. Marcelo  Dispense: 180 tablet; Refill: 1    If protocol passes may refill for 12 months if within 3 months of last provider visit (or a total of 15 months).

## 2021-06-15 NOTE — PROGRESS NOTES
Patient has not had his implanted pacemaker checked in the clinic at  [facility] since 4/4/16. Multiple attempts were made to contact patient by phone and mail asking him to schedule an appointment. Per device clinic's delinquent patient policy, a letter was sent by certified mail 12/14/17 asking the patient to schedule an appointment with [facility] device clinic within 30 days, or update us if care has been transferred elsewhere. No response from patient. Made inactive in the [employer] database, and remote monitor inactivated on the [employer] website. No further attempts will be made to contact the patient. This does not affect the function of his implanted device, however, the battery will continue to decline and a replacement will be needed. Patient can re-establish care with the device clinic at any time.

## 2021-06-16 PROBLEM — I50.20 HFREF (HEART FAILURE WITH REDUCED EJECTION FRACTION) (H): Status: ACTIVE | Noted: 2018-07-02

## 2021-06-16 PROBLEM — Z86.74 HX-SUDDEN CARDIAC ARREST: Status: ACTIVE | Noted: 2018-06-08

## 2021-06-16 PROBLEM — Z95.810 ICD (IMPLANTABLE CARDIOVERTER-DEFIBRILLATOR) IN PLACE: Status: ACTIVE | Noted: 2018-06-08

## 2021-06-16 PROBLEM — I44.2 ATRIOVENTRICULAR BLOCK, COMPLETE (H): Status: ACTIVE | Noted: 2019-12-31

## 2021-06-16 NOTE — TELEPHONE ENCOUNTER
Telephone Encounter by Heather Mckeon RN at 1/2/2020  9:46 AM     Author: Heather Mckeon RN Service: -- Author Type: Registered Nurse    Filed: 1/2/2020  9:46 AM Encounter Date: 12/31/2019 Status: Signed    : Heather Mckeon RN (Registered Nurse)       Leticia Solis Jenna M, RN   Caller: Unspecified (2 days ago, 12:54 PM)             No, he stated he does not want to drive 5 hours to come to the clinic anymore.     -Leticia    Previous Messages            ICD shock      Leticia Solis  You 27 minutes ago (9:19 AM)      No, he stated he does not want to drive 5 hours to come to the clinic anymore.     -Leticia    Routing comment        You  Leticia Solis 1 hour ago (8:06 AM)      Amado Kelly,   Is he not interested in following at our clinic still? If not, I would recommend he establish with a new cardiologist. If wants to see us, Khloe needs to see him.     Thanks!   Heather     Routing comment        Leticia Solis  You 2 days ago      I called the patient and he stated he lives in [place], [place] which is 5 hours away. How should we proceed?     -Leticia    Routing comment        You  [facility] Scheduling Registration Pool 2 days ago      Please schedule patient with Khloe per Dr. Perez. Thanks - Heather     Routing comment

## 2021-06-16 NOTE — TELEPHONE ENCOUNTER
Telephone Encounter by Heather Mckeon RN at 12/31/2019 12:53 PM     Author: Heather Mckeon RN Service: -- Author Type: Registered Nurse    Filed: 12/31/2019  2:11 PM Encounter Date: 12/31/2019 Status: Addendum    : Heather Mckeon RN (Registered Nurse)    Related Notes: Original Note by Heather Mckeon RN (Registered Nurse) filed at 12/31/2019 12:57 PM       Attempted to reach patient to discuss shock. No answer on phone and mailbox is full. Will attempt again. JML    Remote Report:  Type: ICD alert remote transmission for shock therapy to convert arrhythmia.   Presenting rhythm: sinus 65 bpm.  Battery/lead status: stable.  Arrhythmias: since 12/19/19; one VF episode on 12/30/19, duration 21sec, ATPx1 unsuccessful, 41J shock with immediate conversion. 40 mode switch episodes all <2min, available EGMs show FFRW oversensing.   Comments: appears to be normal ICD function. Routed to device RN for review.   Device/lead alerts: none. DANIAL ADD: VF/possible torsades due to variable morphology on the shock EGM; event occurred 12/30/19 at 06:28, successful termination of arrhythmia with shock. See telephone note. JML

## 2021-06-16 NOTE — TELEPHONE ENCOUNTER
Telephone Encounter by Heather Mckeon, RN at 12/31/2019  2:47 PM     Author: Heather Mckeon, RN Service: -- Author Type: Registered Nurse    Filed: 12/31/2019  2:50 PM Encounter Date: 12/31/2019 Status: Signed    : Heather Mckeon, RN (Registered Nurse)       Torres Perez MD Lowndes, Jenna M, RN; Khloe Liao CNP   Caller: Unspecified (Today, 12:54 PM)             Appropriate therapy for ventricular fibrillation with ICD shock.  Recommend reestablish care with heart failure program, Khloe Liao CNP with BMP and magnesium at that time.  Also recommend assessing ischemia possibly with stress nuclear imaging.  dd      Reviewed with patient. Will have  call him back to arrange appt with Khloe. KIRSTY

## 2021-06-16 NOTE — TELEPHONE ENCOUNTER
Telephone Encounter by Darnell Reaves RN at 7/2/2019  9:46 AM     Author: Darnell Reaves RN Service: -- Author Type: Registered Nurse    Filed: 7/2/2019  9:51 AM Encounter Date: 7/2/2019 Status: Signed    : Darnell Reaves RN (Registered Nurse)       Left a message for patient to return RN's call inquiring whether or not patient ever contacted [employer] regarding new Latitude monitor per tech instructions on 6/24/19 in [employer]. There are also recommendations from Dr. Perez following a recent ICD shock back in May that needs to be relayed to patient. Recommendations per Dr. Perez as follow:    Torres Perez MD Yang, Youa, RN   Caller: Unspecified (1 week ago)             Elective fu with me in device clinic to check K, MG and establish ICD care.  CALEB Reaves RN BSN  Device Clinic

## 2021-06-16 NOTE — TELEPHONE ENCOUNTER
Telephone Encounter by Darnell Reaves RN at 6/19/2019  4:15 PM     Author: Darnell Reaves RN Service: -- Author Type: Registered Nurse    Filed: 6/20/2019 11:22 AM Encounter Date: 6/19/2019 Status: Addendum    : Darnell Reaves RN (Registered Nurse)    Related Notes: Original Note by Darnell Reaves RN (Registered Nurse) filed at 6/20/2019 11:13 AM       Type: Annual in-clinic ICD check   Presenting: AS-VS, 73bpm  Lead/Battery Status: Stable lead and battery measurements.  Atrial Arrhythmias: 1.6k mode switches for <1% of the time. EGMs suggest FFRW.   Vent Arrhythmias: 1 NSVT episode detected; EGM shows 8 seconds of NSVT at 217bpm on 2/6/2019. 1 VF episode also detected. EGM shows VF unsuccessfully treated with ATP x 1 resulting in a successful shock x1 on 5/9/19. Patient admits feeling the shock and denies any preceding symptoms.   Comments: Normal device function. No programming changes made. Note routed to Dr. Perez for review. YY    Education  Discussed appropriate actions to take should patient receive shocks in the future:   - If 1 shock occurs and patient feels well, he should report it to the clinic   - If patient receives 1 shock and does not feel well or if there are multiple shocks being delivered, patient is to seek emergent care.     Patient and patient's spouse verbalized understanding of instructions.     Also discussed remote monitoring. Patient reports that his monitor is plugged in however, there has not been a connection since 5/22/2017 per [employer] website. Discussed the importance of remote monitoring; patient verbalized understanding. Patient states that he will reset his monitor at home. Instructed Device Techs to follow-up with patient regarding disconnected monitor, patient will also need a remote check in 3 months.     Darnell Reaves RN BSN  Device RN   [facility]

## 2021-06-18 NOTE — LETTER
Letter by Eris Cook MD at      Author: Eris Cook MD Service: -- Author Type: --    Filed:  Encounter Date: 2/26/2019 Status: (Other)       Pablito Hurley  [address] [place] [id number]             February 26, 2019        Dear Mr. Hurley,    Below are the results from your recent visit:    Resulted Orders   Occult Blood(ICT)   Result Value Ref Range    Fecal Occult Bld (ICT) 1 Negative Negative    Fecal Occult Blood (ICT) 2 Negative Negative    Fecal Occult Blood (ICT) 3 Negative Negative   Glycosylated Hemoglobin A1c   Result Value Ref Range    Hemoglobin A1c 8.8 (H) 3.5 - 6.0 %       The stool test did not show any evidence of blood.  Repeat one year.      Please call with questions or contact us using Mine.    Sincerely,        Electronically signed by Eris Cook MD

## 2021-06-18 NOTE — PROGRESS NOTES
In clinic device check with Device Nurse after visit with Dr. Sinclair.  Please see link for full device report.  Patient was informed of results and next follow up during today's visit.

## 2021-06-18 NOTE — PROGRESS NOTES
3 wks ago ED visit shortness of breath.    Dx chf exacerbation.   High bnp.   Iv furosemide.   Felt better.   Went home and advised two times a day furosemide 40.        Now shortness of breath return and when sleep feels decreased breath time.      Stroke syndrome denies new numbness or weakness.  Hx right ear issues    Pain comes and goes     Saw eye    entresto was suggested but not given as no coverage.  Feels this provider can write the script    Saw [facility]   Card     Wishes to switch to he system    Hx ear issues.  Right pain.  No drainage    ROS: as noted above    OBJECTIVE:   Vitals:    05/23/18 0948   BP: 130/86   Pulse: 65   Resp: 20   SpO2: 99%      Eyes: non icteric, noninflamed  Lungs: no resp distress  Heart: regular  Ankles: no edema  Muscles: nontender  Mental status: euthymic  Neuro: nonfocal  Wt is up four pounds from when dx chf    Right tm bright red and tiny perf     ASSESSMENT/PLAN:    1. History of stroke with residual deficit left hemiparesis     2. Benign Essential Hypertension     3. Pure hypercholesterolemia     4. Cardiomyopathy  Ambulatory referral to Cardiology    furosemide (LASIX) 40 MG tablet   5. Tympanic membrane perforation  Ambulatory referral to Otolaryngology   6. Type 2 diabetes mellitus without complication  Microalbumin, Random Urine     Increase po furosemide to q8h until wt comes down 2 pounds and when resp baseline  Card referral in [employer] system  Chronic issues stable/ same treatment.

## 2021-06-19 NOTE — LETTER
Letter by Dia Lopez EPS at      Author: Dia Lopez EPS Service: -- Author Type: --    Filed:  Encounter Date: 3/28/2019 Status: (Other)         Pablito Hurley  [address] [address]      March 28, 2019      Dear Mr. Hurley,      We are writing to let you know that the remote monitor for your defibrillator is not connecting.     We called you about your disconnected monitor on January 4, 2019. We couldn't reach you at that time.    As soon as you can, please call our clinic so we can help you get your monitor re-connected.   Call [phone] and pick option 2.       Sincerely,    [facility]

## 2021-06-19 NOTE — LETTER
Letter by Meek Monzon RN at      Author: Meek Monzon RN Service: -- Author Type: --    Filed:  Encounter Date: 4/14/2019 Status: (Other)         Pablito Hurley  [address] [place] [address]                 April 17, 2019       Dear Mr. Hurley,     We have been trying to contact you regarding a telephone call you made on 4/14/2019 about your   high blood sugar. Per Dr. Cook keep taking the metformin. He sent a medication to the pharmacy for you  call glipizide. Take the medication 5 two times a day, and Walk more. Check back in six wks     Please call with questions or contact us using [employer].      Sincerely,        Electronically signed by Meek Monzon RN

## 2021-06-19 NOTE — LETTER
Letter by Dorita Nath RDCS at      Author: Dorita Nath RDCS Service: -- Author Type: --    Filed:  Encounter Date: 9/18/2019 Status: (Other)         Pablito Hurley  [address] [place] [address]      September 18, 2019      Dear Mr. Hurley,    RE: Remote Results    We are writing to you regarding your recent Remote ICD check from home. Your transmission was received successfully. Battery status is satisfactory at this time.     Your results are showing no significant changes.    Your next device appointment will be a remote check on December 19, 2019; this will occur automatically.    To schedule or reschedule, please call [phone] and press 1.    NOTE: If you would like to do an extra transmission, please call [phone] and press 3 to speak to a nurse BEFORE transmitting. This ensures that the Device Clinic staff is aware of the reason you are sending a transmission, and can follow-up with you after it has been reviewed.    We will be checking your implanted device from home (remotely) every three months unless otherwise instructed. We will need to see you in the clinic at least once a year. You may need to be seen in the clinic sooner depending on the results of your check.    Please be aware:    The follow-up schedule is like a Physician prescription.    Your remote monitor is paired to your specific implanted device.      Sincerely,    [facility]

## 2021-06-19 NOTE — LETTER
Letter by Dia Lopez EPS at      Author: Dia Lopez EPS Service: -- Author Type: --    Filed:  Encounter Date: 7/19/2019 Status: (Other)         Pablito Hurley  [address] [place] [address]      July 19, 2019      Dear Mr. Hurley,      We are writing to let you know that the remote monitor for your defibrillator is not connecting.     We called you about your disconnected monitor on July 3, 2019. We tried to re-connect your monitor at that time, but we couldn't. We asked you to contact [employer] for help.     As soon as you can, please call our clinic so we can help you get your monitor re-connected.   Call [phone] and pick option 2.       Sincerely,    [facility]

## 2021-06-19 NOTE — PROGRESS NOTES
Anesthesia Pre Eval Note    Anesthesia Plan  No phone call attempted due to:  ASA Status: 2  Anesthesia Type: MAC      Checklist  Reviewed: Past Med History, Allergies, Patient Summary, Medications, Problem list and NPO Status    Informed Consent  The proposed anesthetic plan, including its risks and benefits, have been discussed with the Patient  - along with the risks and benefits of alternatives.  Questions were encouraged and answered and the patient and/or representative understands and agrees to proceed.         Relevant Problems   No relevant active problems       Anesthesia ROS/Med Hx        Anesthetic Complication History:  Patient does not have a history of anesthetic complications      Pulmonary Review:  Patient does not have a pulmonary history      Neuro/Psych Review:    Positive for psychiatric history    Cardiovascular Review:    Positive for hypertension    GI/HEPATIC/RENAL Review:  Patient does not have a GI/hepatic/renalhistory       End/Other Review:    Positive for obesity       Physical Exam     Airway   Mallampati: II  TM Distance: <3 FB  Neck ROM: Full  Neck: Short    Cardiovascular    Cardio Rhythm: Regular  Cardio Rate: Normal    General Assessment  General Assessment: Alert and oriented    Dental Exam      Legend: C=Chipped  M=Missing  L=Loose    Pulmonary Exam    Breath sounds clear to auscultation:  Yes    Abdominal Exam    Patient Demonstrates:  Obese    Other Findings  Risks of dental damage discussed and understood by patient     Patient and his wife, Ro, seen in clinic for HF education s/p recent emergency department visit 5/2/18.  Reviewed HF Binder that includes the  HF Sx Awareness & Action plan  handout and  A Stronger Pump  booklet and Weight log booklet highlighting :  __X_patient s type of heart failure _X__Na management in diet  __X_importance of daily wts  _X__Fluid Guidelines, if applicable  __X_medication review and importance of compliance     Instructed patient and his wife in signs and sx of heart failure, reiterated when to call clinic - reviewed HF hotline # [phone] and after hours call # [phone].  Majority of time was spent reviewing: maintaining a low sodium diet. Patient's wife states she does not add salt while cooking and not at the table. Nutrition label reviewed with patient as well as low sodium food options and high sodium foods to avoid.  Patient verbalized understanding of HF discussion.  Plan for f/u with continued HF education reviewed.

## 2021-06-19 NOTE — PROGRESS NOTES
Patient left before being seen for heart failure teach. Heart failure hot line number reviewed in booklet with patient and his wife in check out.

## 2021-06-20 NOTE — LETTER
Letter by Cristy Mercedes at      Author: Cristy Mercedes Service: -- Author Type: --    Filed:  Encounter Date: 9/22/2020 Status: (Other)         Pablito Xavi   [address] [place] [address] [place] [address]      September 22, 2020      Dear Farrukh Xavi,    RE: Remote Results    We are writing to you regarding your recent Remote ICD check from home. Your transmission was received successfully. Battery status is satisfactory at this time.     Your results are showing no significant changes.    Your next device appointment will be a remote check on December 22, 2020; this will occur automatically.    To schedule or reschedule, please call [phone] and press 1.    NOTE: If you would like to do an extra transmission, please call [phone] and press 3 to speak to a nurse BEFORE transmitting. This ensures that the Device Clinic staff is aware of the reason you are sending a transmission, and can follow-up with you after it has been reviewed.    We will be checking your implanted device from home (remotely) every three months unless otherwise instructed. We will need to see you in the clinic at least once a year. You may need to be seen in the clinic sooner depending on the results of your check.    Please be aware:    The follow-up schedule is like a Physician prescription.    Your remote monitor is paired to your specific implanted device.      Sincerely,    [facility]

## 2021-06-20 NOTE — LETTER
Letter by Heather Mckeon RN at      Author: Heather Mckeon RN Service: -- Author Type: --    Filed:  Encounter Date: 6/23/2020 Status: (Other)         Pablito Hurley  [address] [place] [address]      June 23, 2020      Dear Mr. Hurley,    RE: Remote Results    We are writing to you regarding your recent Remote ICD check from home. Your transmission was received successfully. Battery status is satisfactory at this time.     Your results are within normal limits.    Your next device appointment will be a remote check on 9/22/2020; this will occur automatically.    To schedule or reschedule, please call [phone] and press 1.    NOTE: If you would like to do an extra transmission, please call [phone] and press 3 to speak to a nurse BEFORE transmitting. This ensures that the Device Clinic staff is aware of the reason you are sending a transmission, and can follow-up with you after it has been reviewed.    We will be checking your implanted device from home (remotely) every three months unless otherwise instructed. We will need to see you in the clinic at least once a year. You may need to be seen in the clinic sooner depending on the results of your check.    Please be aware:    The follow-up schedule is like a Physician prescription.    Your remote monitor is paired to your specific implanted device.      Sincerely,    [facility]

## 2021-06-20 NOTE — LETTER
Letter by Cristy Mercedes at      Author: Cristy Mercedes Service: -- Author Type: --    Filed:  Encounter Date: 12/19/2019 Status: Signed         Pablito Hurley  [address] [place], [place] [address]  [facility] [address]      December 19, 2019      Dear Mr. Hurley,    RE: Remote Results    We are writing to you regarding your recent Remote ICD check from home. Your transmission was received successfully. Battery status is satisfactory at this time.     Your results are showing no significant changes.    Your next device appointment will be a remote check on March 23, 2020; this will occur automatically.    To schedule or reschedule, please call [phone] and press 1.    NOTE: If you would like to do an extra transmission, please call [phone] and press 3 to speak to a nurse BEFORE transmitting. This ensures that the Device Clinic staff is aware of the reason you are sending a transmission, and can follow-up with you after it has been reviewed.    We will be checking your implanted device from home (remotely) every three months unless otherwise instructed. We will need to see you in the clinic at least once a year. You may need to be seen in the clinic sooner depending on the results of your check.    Please be aware:    The follow-up schedule is like a Physician prescription.    Your remote monitor is paired to your specific implanted device.      Sincerely,    [facility]

## 2021-06-20 NOTE — LETTER
Letter by Cristy Mercedes at      Author: Cristy Mercedes Service: -- Author Type: --    Filed:  Encounter Date: 3/23/2020 Status: (Other)         Pablito Hurley  [address] [place] [address]      March 23, 2020      Dear Mr. Hurley,    RE: Remote Results    We are writing to you regarding your recent Remote ICD check from home. Your transmission was received successfully. Battery status is satisfactory at this time.     Your results are showing no significant changes.    Your next device appointment will be a remote check on June 23, 2020; this will occur automatically.    To schedule or reschedule, please call [phone] and press 1.    NOTE: If you would like to do an extra transmission, please call [phone] and press 3 to speak to a nurse BEFORE transmitting. This ensures that the Device Clinic staff is aware of the reason you are sending a transmission, and can follow-up with you after it has been reviewed.    We will be checking your implanted device from home (remotely) every three months unless otherwise instructed. We will need to see you in the clinic at least once a year. You may need to be seen in the clinic sooner depending on the results of your check.    Please be aware:    The follow-up schedule is like a Physician prescription.    Your remote monitor is paired to your specific implanted device.      Sincerely,    [facility]

## 2021-06-21 NOTE — LETTER
Letter by Dorita Nath RDCS at      Author: Dorita Nath RDCS Service: -- Author Type: --    Filed:  Encounter Date: 12/22/2020 Status: (Other)         Pablito Hurley   [address] [place] [address]      December 22, 2020      Dear Mr. Hurley,    RE: Remote Results    We are writing to you regarding your recent Remote ICD check from home. Your transmission was received successfully. Battery status is satisfactory at this time.     Your results are showing no significant changes.    Your next device appointment will be a clinic visit.  Please call in January to schedule.    Let us know if you have transferred care so we can update our system.   To schedule or reschedule, please call [phone] and press 1.    NOTE: If you would like to do an extra transmission, please call [phone] and press 3 to speak to a nurse BEFORE transmitting. This ensures that the Device Clinic staff is aware of the reason you are sending a transmission, and can follow-up with you after it has been reviewed.    We will be checking your implanted device from home (remotely) every three months unless otherwise instructed. We will need to see you in the clinic at least once a year. You may need to be seen in the clinic sooner depending on the results of your check.    Please be aware:    The follow-up schedule is like a Physician prescription.    Your remote monitor is paired to your specific implanted device.      Sincerely,    [facility]

## 2021-06-22 NOTE — PROGRESS NOTES
ANW VISIT      Questionnaire reviewed.    Left hemiplegic cva    Stroke syndrome denies new numbness or weakness.  Hypertension denies chest pain or headache.  Hyperlipidemia on statin denies muscle pain  Diabetes denies polyuria.  Gastritis denies dysphagia, melena, hematochezia  Congestive heart failure denies edema, weight gain, nighttime shortness of breath or PND  Coronary disease denies chest pain  Defibrillator    Exercise treadmill   30 minutes  tv   Listen music.   iphone ipad    Watches diet.    ROS:  Constitutional: denies fever  Vision: denies change in vision  ENT: denies cough or congestion      occ runny nose and cough.    Itch. Allergy   Never used nasal steroid    Night plugging  Thyroid: denies unusual fatigue  Resp: denies shortness of breath  Card: denies palpitations  GI: denies vomiting or abnormal stool, melena, hematochezia  : denies dysuria  Neuro: denies new weakness  Derm: denies rash  Joints: denies redness or swelling  Endo: denies polyuria  Mental health: mood is good  Extremities: no edema  Mobility: stable    Otherwise negative review of systems         OBJECTIVE:   Vitals:    12/10/18 0948   BP: (!) 132/94   Pulse: 76   Resp: 20      Wt is noted.  No diaphoresis  Eyes: nl eom, anicteric   External ears, nose: nl    Neck: nl nodes, supple, thyroid normal   Lungs: clear to ausc   Heart: regular rhythm  Abd: soft nontender     No cva (renal) tenderness  Neuro: left weakness  Skin no rash  Joints: uninflamed   No ketotic breath odor noted  Mental: euthymic  Ext: nontender calves   Gait: normal    Body mass index is 27.12 kg/m .       ASSESSMENT/PLAN:   Health Maintenance/ Plan: anticipatory guidance, discussion of risk factors, lifestyle modification, and risk factor management.      Cough   Nasal allergy  etd / nasal steroid     Additional diagnoses and related orders:  1. Routine general medical examination at a health care facility     2. History of stroke with residual deficit  left hemiparesis     3. Coronary artery disease due to lipid rich plaque     4. Benign Essential Hypertension     5. Pure hypercholesterolemia  LDL Cholesterol, Direct   6. Type 2 diabetes mellitus without complication, without long-term current use of insulin (H)  Glycosylated Hemoglobin A1c   7. Gastritis Due To H. Pylori     8. Ischemic cardiomyopathy     9. Systolic heart failure, unspecified HF chronicity (H)     10. ICD (implantable cardioverter-defibrillator) in place     11. Dysfunction of Eustachian tube, unspecified laterality  fluticasone (FLONASE) 50 mcg/actuation nasal spray   12. Non-seasonal allergic rhinitis due to pollen  fluticasone (FLONASE) 50 mcg/actuation nasal spray       Chronic issues stable/ same treatment.  3 mo follow up   Care team    pcp   KARLEY Chun

## 2021-06-24 NOTE — TELEPHONE ENCOUNTER
Take the medicines as directed by Dr. Cook.  Keep checking blood sugars.  If consistently above 140 when you are fasting after about a week, see Dr. Cook again.  Could increase medicines again.

## 2021-06-24 NOTE — TELEPHONE ENCOUNTER
Wrong dept associated with this encounter. Encounter closed and new encounter started.    Brenda Stevens, RN, BSN, PHN  [facility] Medication Refill Nurse  2/22/2019  9:50 AM

## 2021-06-24 NOTE — TELEPHONE ENCOUNTER
Dr. Cook, patient is requesting a refill on his Metformin. He last saw you on 12/10/18 for a Annual Wellness. If ok, please send refill to [facility]. Thank You.

## 2021-06-24 NOTE — TELEPHONE ENCOUNTER
"Sugar running 190-200.  Has not eaten.    Drinks a lot of hot tea in morning.  Cereal for breakfast.    Does not feel ill.  Feels \"normal\"    Wants to know if he should increase his metformin.  He thinks 200 is high for him.    Provider please advise      Jeanette Goodrich, RN, [facility] Nurse Triage/Med Refills RN     Reason for Disposition    Blood glucose  mg/dl (3.5 -13 mmol/l)    Protocols used: DIABETES - HIGH BLOOD SUGAR-A-[place]      "

## 2021-06-24 NOTE — TELEPHONE ENCOUNTER
Medication Request  Medication name: metformin    Pharmacy Name and Location: [facility]   Reason for request: He wants it filled today if possible but no active order.   When did you use medication last?:  today  Okay to leave a detailed message: yes

## 2021-06-24 NOTE — TELEPHONE ENCOUNTER
Called and spoke with patient. Patient stated that he was taking the metformin 3 times a day for 2 weeks already c. He stated that in he past he had taken another type of metformin that the capsule is long. Pt stated you should know what that medication is. He feels like the past medication he was on helped him more.

## 2021-06-24 NOTE — PROGRESS NOTES
Runny nose otherwise no clear infection    The nasal steroid did not help    Blood sugars 240s   Past two weeks    Stroke syndrome denies new numbness or weakness.  Congestive heart failure denies edema, weight gain, nighttime shortness of breath or PND    Hx on metformin.  Wishes as sugars running high    Per notes the metformin finished in September 2017.    Followed by card         OBJECTIVE:   Vitals:    02/22/19 0930   BP: 130/84   Pulse: 79   Resp: 16   Temp: 98.7  F (37.1  C)   SpO2: 97%      Eyes: non icteric, noninflamed  Lungs: no resp distress  Heart: regular  Ankles: no edema  Muscles: nontender  Mental status: euthymic  Neuro:left hemiparetic  Body mass index is 26.95 kg/m .     ASSESSMENT/PLAN:    Additional diagnoses and related orders:  1. Screen for colon cancer  Occult Blood(ICT)   2. Type 2 diabetes mellitus without complication, without long-term current use of insulin (H)  Glycosylated Hemoglobin A1c    blood glucose test strips    metFORMIN (GLUCOPHAGE) 500 MG tablet   3. History of stroke with residual deficit left hemiparesis     4. Ischemic cardiomyopathy       follow up couple months  Chronic issues stable/ same treatment.

## 2021-06-24 NOTE — TELEPHONE ENCOUNTER
TONJA,  Called and spoke with pt , Message was given, and pt understood. Patient wants to know he started his metformin 2-3 days ago and this morning he checked his blood sugars and it was at 170 fasting. He wants to know what to do. Please Advise.

## 2021-06-24 NOTE — TELEPHONE ENCOUNTER
----- Message from Eris Cook MD sent at 2/22/2019 11:39 AM CST -----  Please call patient.  Tell patient:diabetes mellitus up.    Sent metformin two times a day check in two months

## 2021-06-24 NOTE — TELEPHONE ENCOUNTER
Called the patient at correct number [phone]. I've asked Alexander at  to help make this change. The one listed in chart is incorrect.  Spoke to the pt and informed him of Dr. Cook's message. He verbalized understanding. appt was made for the pt to come in to see Dr. Cook this Friday at 9:45am.

## 2021-06-26 NOTE — PROGRESS NOTES
Progress Notes by Celeste Sinclair MD at 6/8/2018  7:50 AM     Author: Celeste Sinclair MD Service: -- Author Type: Physician    Filed: 6/8/2018  8:42 AM Encounter Date: 6/8/2018 Status: Signed    : Celeste Sinclair MD (Physician)           Click to link to [facility] Heart [facility]     [facility] HEART CARE NOTE    Thank you, Dr. Cook, for asking the [facility] team to see Mr. Pablito Hurley to evaluate chronic systolic heart failure and severe ischemic cardiomyopathy.    Assessment/Recommendations   Assessment:    1.  Severe ischemic cardiomyopathy, estimated ejection fraction 20% by echocardiogram one year ago.  He did have a recent episode of acute congestive heart failure which resolved in the ED following a single dose of IV Lasix and recent increase in his oral dose to 80 mg daily.  He is on a good regimen of medications currently but is interested in potentially switching to Entresto which he heard about last year when he was at [facility].  I did tell him that it can be extremely costly but he is willing to give it a try.  We will have him stop his losartan as of today and begin the lower dose of Entresto on Sunday.  I will also have him cut back his furosemide to 40 mg daily once he gets started.  2.  Chronic systolic heart failure, fairly well compensated at today's visit.  He denies any symptoms of orthopnea, PND or lower extremity edema.  Will check a basic metabolic profile and BNP to compare to his last labs in early May.  3.  History of out of hospital cardiac arrest April 2017 with successful resuscitation and subsequent implantation of a dual-chamber ICD.  This was all performed at [facility].  He has had no follow-up of his device as he wanted to transition his cardiac care back here to [facility].  He will undergo a device check after his visit today.  4.  Coronary artery disease, status post previous myocardial infarction.  Coronary angiogram 1 year ago demonstrated  no acute cause for his VF arrest.  He denies any anginal symptoms currently.  Continue medical management.    Plan:  1.  Discontinue losartan as of today  2.  Begin Entresto 24-26 mg tablet twice daily beginning 6/10/18 and decrease furosemide to 40 mg daily  3.  Follow-up in 3 weeks with heart failure nurse practitioner       History of Present Illness    Mr. Pablito Hurley is a 57 y.o. male with history of coronary artery disease, status post ST elevation MI in January 2016 with drug-eluting stent placement to the LAD, complete heart block requiring permanent pacemaker with subsequent upgrade to dual-chamber ICD 1 year ago following out of hospital cardiac arrest, CVA in 2011, essential hypertension who had previously been under the care of [facility] until one year ago when he suffered an out of hospital cardiac arrest while undergoing cataract extraction surgery.  He was successfully resuscitated and taken to [facility] where subsequent coronary angiography demonstrated no culprit lesion.  He subsequently underwent change out of his dual-chamber pacemaker to a dual-chamber ICD.  He has done fairly well over the past year with only one subsequent follow-up visit at [facility].  Recently, he was seen in the ED for acute congestive heart failure symptoms.  He was given a single dose of IV Lasix with marked improvement in symptoms and subsequently discharged home.  He has been placed on a higher oral dose of furosemide and has done well over the course of the past month.  He currently reports no symptoms of orthopnea, PND or lower extremity edema.  He states his weights have been stable.  He has not followed up on device checks and will have an ICD check performed following his visit today.  No symptoms of lightheadedness or near syncope.  No discharges from his defibrillator.  He does bring up Entresto which was mentioned by the [facility] cardiologist although  at the time he had no insurance coverage.  He tells me he has insurance coverage now and would like to go on to that medication.    ECG (personally reviewed): No ECG    Cardiac Imaging Studies (personally reviewed): No recent cardiac imaging     Physical Examination Review of Systems   Vitals:    06/08/18 0744   BP: 128/80   Pulse: 64     Body mass index is 27.15 kg/(m^2).  Wt Readings from Last 3 Encounters:   06/08/18 158 lb 3.2 oz (71.8 kg)   05/23/18 154 lb (69.9 kg)   05/02/18 150 lb (68 kg)     General Appearance:   Awake, Alert, No acute distress.   HEENT:  No scleral icterus; the mucous membranes were pink and moist.   Neck: No cervical bruits or jugular venous distention    Chest: The spine was straight. The chest was symmetric.   Lungs:   Respirations unlabored; the lungs are clear to auscultation. No wheezing   Cardiovascular:    Regular rate and rhythm.  S1, S2 normal.  1/6 mid peaking systolic ejection murmur heard at the left upper sternal border.  2/6 holosystolic murmur heard at the apex.   Abdomen:  No organomegaly, masses, bruits, or tenderness. Bowels sounds are present   Extremities:  No peripheral edema bilaterally.   Skin: No xanthelasma. Warm, Dry.   Musculoskeletal: No tenderness.   Neurologic: Mood and affect are appropriate.                                              Medical History  Surgical History Family History Social History   Past Medical History:   Diagnosis Date   ? Benign essential hypertension    ? Cardiac pacemaker in situ    ? Coronary artery disease due to lipid rich plaque 1/29/2016   ? Dyslipidemia    ? h/o Ventricular mural thrombus 7/18/2016   ? History of stroke with residual deficit left hemiparesis     Created by Conversion    ? Type 2 diabetes mellitus     Past Surgical History:   Procedure Laterality Date   ? cardiac pacemaker  02/01/2016    Family History   Problem Relation Age of Onset   ? Diabetes type II Mother     Social History     Social History   ? Marital  status:      Spouse name: N/A   ? Number of children: 6   ? Years of education: N/A     Occupational History   ? Not on file.     Social History Main Topics   ? Smoking status: Never Smoker   ? Smokeless tobacco: Never Used   ? Alcohol use No   ? Drug use: No   ? Sexual activity: Not on file     Other Topics Concern   ? Not on file     Social History Narrative          Medications  Allergies   Current Outpatient Prescriptions   Medication Sig Dispense Refill   ? atorvastatin (LIPITOR) 80 MG tablet Take 1 tablet (80 mg total) by mouth at bedtime. TXHUA HMO THAUM MUS PW NOJ 1 LUB PAB SHEA NTSHAV HAKAN ROJ 90 tablet 1   ? carvedilol (COREG) 12.5 MG tablet Take 12.5 mg by mouth 2 (two) times a day.  3   ? clopidogrel (PLAVIX) 75 mg tablet Take 1 tablet (75 mg total) by mouth daily. 30 tablet 1   ? furosemide (LASIX) 40 MG tablet q 8 hours until weight down 2 pounds then bid 90 tablet 0   ? losartan (COZAAR) 100 MG tablet Take 100 mg by mouth daily.  3   ? sacubitril-valsartan (ENTRESTO) 24-26 mg Tab tablet Take 1 tablet by mouth 2 (two) times a day. 30 tablet 6     No current facility-administered medications for this visit.       Allergies   Allergen Reactions   ? Ace Inhibitors Cough         Lab Results    Chemistry/lipid CBC Cardiac Enzymes/BNP/TSH/INR   Lab Results   Component Value Date    CHOL 165 01/28/2016    HDL 43 01/28/2016    LDLCALC 101 01/28/2016    TRIG 106 01/28/2016    CREATININE 1.01 05/02/2018    BUN 14 05/02/2018    K 3.8 05/02/2018     05/02/2018     05/02/2018    CO2 28 05/02/2018    Lab Results   Component Value Date    WBC 11.6 (H) 05/02/2018    HGB 17.3 05/02/2018    HCT 50.9 05/02/2018    MCV 90 05/02/2018     05/02/2018    Lab Results   Component Value Date    CKTOTAL 33 11/19/2014    CKMB 18 (HH) 01/30/2016    TROPONINI 0.03 05/02/2018    BNP 1452 (H) 05/02/2018    TSH 2.95 01/29/2016    INR 1.10 05/02/2018

## 2021-06-26 NOTE — PROGRESS NOTES
Progress Notes by Khloe Liao CNP at 7/23/2018  8:30 AM     Author: Khloe Liao CNP Service: -- Author Type: Nurse Practitioner    Filed: 7/23/2018  8:54 AM Encounter Date: 7/23/2018 Status: Signed    : Khloe Liao CNP (Nurse Practitioner)           Click to link to [facility] Heart [facility] HEART CARE NOTE      Assessment/Recommendations   Assessment:    1.  Heart failure with reduced ejection fraction, ejection fraction 26%, NYHA class I: He denies any symptoms of acute heart failure.  He has been walking daily and denies any shortness of breath.  He is tolerating a higher dose of Entresto.    2.  Hypertension: Blood pressure is well controlled today at 128/78.    Plan:  1.   Heart failure medications:  - Beta blocker therapy with carvedilol 12.5 mg twice daily  - ARNI therapy with Entresto 49/51 mg twice daily (taking 2 tablets of 24/26 mg twice daily to use up prescription)  - Diuretic therapy with furosemide 40 mg daily  2.  BMP pending  3.  If BMP is stable, will plan to increase Entresto to 97/103 mg twice daily.    Pablito Hurley will follow up 3 weeks after Entresto is increased.     History of Present Illness    Mr. Pablito Hurley is a 58 y.o. male seen at [facility] heart failure clinic today for continued follow-up.  He has a history of heart failure with reduced ejection fraction, hypertension, STEMI with drug-eluting stent to LAD in 2016, CVA, and CRT-D following cardiac arrest during cataract surgery in April 2017.  Echocardiogram in June 2017 showed an ejection fraction of 26%, mild to moderate mitral regurgitation, and mild to moderate tricuspid regurgitation.    During the last clinic visit, his Entresto was increased.  He denies any problems on this increased dose.  Blood pressure stable today at 128/78.  He has started walking a mile and a half daily.  He has lost 5 pounds in the past month.  Home weight is now around 140 pounds.  He  denies fatigue, lightheadedness, shortness of breath, dyspnea on exertion, orthopnea, chest pain and lower extremity edema.       Physical Examination Review of Systems   Vitals:    07/23/18 0829   BP: 128/78   Pulse: 64   Resp: 16     Body mass index is 26.78 kg/(m^2).  Wt Readings from Last 3 Encounters:   07/23/18 156 lb (70.8 kg)   07/02/18 154 lb (69.9 kg)   06/08/18 158 lb 3.2 oz (71.8 kg)       General Appearance:     Alert, cooperative and in no acute distress.   ENT/Mouth: membranes moist, no oral lesions or bleeding gums.      EYES:  no scleral icterus, normal conjunctivae   Chest/Lungs:   lungs are clear to auscultation, no rales or wheezing, respirations unlabored   Cardiovascular:   Regular. Normal first and second heart sounds with no murmurs, rubs, or gallops; no edema bilateral lower extremities    Abdomen:  Soft, nontender, nondistended, bowel sounds present   Extremities: no cyanosis or clubbing   Skin: warm, dry.    Neurologic: mood and affect are appropriate, alert and oriented x3      General: Weight Loss  Eyes: WNL  Ears/Nose/Throat: WNL  Lungs: WNL  Heart: WNL  Stomach: WNL  Bladder: WNL  Muscle/Joints: WNL  Skin: WNL  Nervous System: WNL  Mental Health: WNL     Blood: WNL     Medical History  Surgical History Family History Social History   Past Medical History:   Diagnosis Date   ? Benign essential hypertension    ? Cardiac pacemaker in situ    ? Coronary artery disease due to lipid rich plaque 1/29/2016   ? Dyslipidemia    ? h/o Ventricular mural thrombus 7/18/2016   ? History of stroke with residual deficit left hemiparesis     Created by Conversion    ? Type 2 diabetes mellitus (H)     Past Surgical History:   Procedure Laterality Date   ? cardiac pacemaker  02/01/2016    Family History   Problem Relation Age of Onset   ? Diabetes type II Mother     Social History     Social History   ? Marital status:      Spouse name: N/A   ? Number of children: 6   ? Years of education: N/A      Occupational History   ? Not on file.     Social History Main Topics   ? Smoking status: Never Smoker   ? Smokeless tobacco: Never Used   ? Alcohol use No   ? Drug use: No   ? Sexual activity: Not on file     Other Topics Concern   ? Not on file     Social History Narrative          Medications  Allergies   Current Outpatient Prescriptions   Medication Sig Dispense Refill   ? atorvastatin (LIPITOR) 80 MG tablet Take 1 tablet (80 mg total) by mouth at bedtime. TXHUA HMO THAUM MUS PW NOJ 1 LUB PAB SHEA NTSHAV HAKAN ROJ 90 tablet 1   ? carvedilol (COREG) 12.5 MG tablet Take 1 tablet (12.5 mg total) by mouth 2 (two) times a day. 180 tablet 2   ? clopidogrel (PLAVIX) 75 mg tablet Take 1 tablet (75 mg total) by mouth daily. 90 tablet 1   ? furosemide (LASIX) 40 MG tablet Take one pill by mouth daily. 90 tablet 3   ? sacubitril-valsartan (ENTRESTO) 24-26 mg Tab tablet Take 2 tablets by mouth 2 (two) times a day.       No current facility-administered medications for this visit.       Allergies   Allergen Reactions   ? Ace Inhibitors Cough         Lab Results    Chemistry CBC BNP   Lab Results   Component Value Date    CREATININE 0.81 07/02/2018    BUN 10 07/02/2018     07/02/2018    K 4.4 07/02/2018     (H) 07/02/2018    CO2 26 07/02/2018     Creatinine (mg/dL)   Date Value   07/02/2018 0.81   06/08/2018 0.91   05/02/2018 1.01   07/26/2017 1.05    Lab Results   Component Value Date    WBC 11.6 (H) 05/02/2018    HGB 17.3 05/02/2018    HCT 50.9 05/02/2018    MCV 90 05/02/2018     05/02/2018    Lab Results   Component Value Date     (H) 06/08/2018     BNP (pg/mL)   Date Value   06/08/2018 173 (H)   05/02/2018 1452 (H)            Khloe Liao, [facility] Heart Care   Heart Failure Clinic

## 2021-06-26 NOTE — PROGRESS NOTES
Progress Notes by Khloe Liao CNP at 7/2/2018  9:10 AM     Author: Khloe Liao CNP Service: -- Author Type: Nurse Practitioner    Filed: 7/2/2018 10:26 AM Encounter Date: 7/2/2018 Status: Signed    : Khloe Liao CNP (Nurse Practitioner)           Click to link to [facility] Heart [facility] HEART CARE NOTE      Assessment/Recommendations   Assessment:    1.  Heart failure with reduced ejection fraction, ejection fraction 26%, NYHA class II: He has minimal shortness of breath with activity.  He has noticed improvement since starting Entresto.  He denies any symptoms of acute fluid retention.  His Lasix was decreased when he started Entresto. We reviewed heart failure diagnosis, medications, treatment plan, low sodium diet, weight monitoring, and symptom monitoring.      2.  Hypertension: Blood pressure well controlled today at 116/76.    Plan:  1.   Heart failure medications:  - Beta blocker therapy with carvedilol 12.5 mg twice daily  - ARNI therapy with Entresto 24/26 mg twice daily  - Diuretic therapy with furosemide 40 mg daily  2.  BMP pending  3.  If BMP is stable, will plan increase Entresto.     Pablito Hurley will follow up in the heart failure clinic in 3 weeks.     History of Present Illness    Mr. Pablito Hurley is a 58 y.o. male seen at [facility] heart failure clinic today for continued follow-up.  He has a history of heart failure with reduced ejection fraction, hypertension, STEMI with drug-eluting stent to LAD in 2016, CVA, and CRT-D following cardiac arrest during cataract surgery in April 2017.  Echocardiogram in June 2017 showed ejection fraction of 26%, mild to moderate mitral regurgitation, and mild to moderate tricuspid regurgitation.    He was seen by Dr. Sinclair on June 8.  His losartan was changed to Entresto.  He notes improvement in his breathing since starting Entresto.  He has minimal shortness of breath with walking now. He  denies fatigue, lightheadedness, chest pain and lower extremity edema.      His home weight has been stable around 154 pounds.  He is following a low-sodium diet.    ECHO 6/15/17:   Final Impressions:   1. Mildly enlarged left atrium.   2. Normal left ventricular size, mildly increased wall thickness, moderately severely reduced global systolic function, calculated EF of 26 %.   3. Mid and distal anterior septum, entire apex, and mid septum segment are abnormal.   4. Grade 1 pattern of LV diastolic filling.   5. Right ventricular cavity size is normal, global systolic RV function is borderline reduced.   6. The aortic valve is normal and trileaflet, no stenosis and mild regurgitation.   7. The mitral valve is normal, mild to moderate mitral regurgitation.   8. Mild-moderate tricuspid regurgitation.   9. The ascending aorta is dilated with a maximal diameter of 3.7 cm.  10. Echo contrast was administrered to enhance visualization of all left ventricular segments.     Physical Examination Review of Systems   Vitals:    07/02/18 0908   BP: 116/76   Pulse: 60   Resp: 16     Body mass index is 26.43 kg/(m^2).  Wt Readings from Last 3 Encounters:   07/02/18 154 lb (69.9 kg)   06/08/18 158 lb 3.2 oz (71.8 kg)   05/23/18 154 lb (69.9 kg)       General Appearance:     Alert, cooperative and in no acute distress.   ENT/Mouth: membranes moist, no oral lesions or bleeding gums.      EYES:  no scleral icterus, normal conjunctivae   Chest/Lungs:   lungs are clear to auscultation, no rales or wheezing, respirations unlabored   Cardiovascular:   Regular. Normal first and second heart sounds; the radial and posterior tibial pulses are intact, no edema bilateral lower extremities    Abdomen:  Soft, nontender, nondistended, bowel sounds present   Extremities: no cyanosis or clubbing   Skin: warm, dry.    Neurologic: mood and affect are appropriate, alert and oriented x3      General: Weight Loss  Eyes: WNL  Ears/Nose/Throat:  WNL  Lungs: WNL  Heart: WNL  Stomach: WNL  Bladder: WNL  Muscle/Joints: WNL  Skin: WNL  Nervous System: WNL  Mental Health: WNL     Blood: WNL     Medical History  Surgical History Family History Social History   Past Medical History:   Diagnosis Date   ? Benign essential hypertension    ? Cardiac pacemaker in situ    ? Coronary artery disease due to lipid rich plaque 1/29/2016   ? Dyslipidemia    ? h/o Ventricular mural thrombus 7/18/2016   ? History of stroke with residual deficit left hemiparesis     Created by Conversion    ? Type 2 diabetes mellitus (H)     Past Surgical History:   Procedure Laterality Date   ? cardiac pacemaker  02/01/2016    Family History   Problem Relation Age of Onset   ? Diabetes type II Mother     Social History     Social History   ? Marital status:      Spouse name: N/A   ? Number of children: 6   ? Years of education: N/A     Occupational History   ? Not on file.     Social History Main Topics   ? Smoking status: Never Smoker   ? Smokeless tobacco: Never Used   ? Alcohol use No   ? Drug use: No   ? Sexual activity: Not on file     Other Topics Concern   ? Not on file     Social History Narrative          Medications  Allergies   Current Outpatient Prescriptions   Medication Sig Dispense Refill   ? atorvastatin (LIPITOR) 80 MG tablet Take 1 tablet (80 mg total) by mouth at bedtime. TXHUA HMO THAUM MUS PW NOJ 1 LUB PAB SHEA NTSHAV HAKAN ROJ 90 tablet 1   ? carvedilol (COREG) 12.5 MG tablet Take 1 tablet (12.5 mg total) by mouth 2 (two) times a day. 180 tablet 2   ? clopidogrel (PLAVIX) 75 mg tablet Take 1 tablet (75 mg total) by mouth daily. 90 tablet 1   ? furosemide (LASIX) 40 MG tablet Take one pill by mouth daily. 90 tablet 3   ? sacubitril-valsartan (ENTRESTO) 24-26 mg Tab tablet Take 1 tablet by mouth 2 (two) times a day. 180 tablet 1     No current facility-administered medications for this visit.       Allergies   Allergen Reactions   ? Ace Inhibitors Cough         Lab  Results    Chemistry CBC BNP   Lab Results   Component Value Date    CREATININE 0.91 06/08/2018    BUN 15 06/08/2018     06/08/2018    K 4.0 06/08/2018     06/08/2018    CO2 29 06/08/2018     Creatinine (mg/dL)   Date Value   06/08/2018 0.91   05/02/2018 1.01   07/26/2017 1.05   11/25/2016 0.90    Lab Results   Component Value Date    WBC 11.6 (H) 05/02/2018    HGB 17.3 05/02/2018    HCT 50.9 05/02/2018    MCV 90 05/02/2018     05/02/2018    Lab Results   Component Value Date     (H) 06/08/2018     BNP (pg/mL)   Date Value   06/08/2018 173 (H)   05/02/2018 1452 (H)          Khloe Liao, CNP  [facility]   Heart Failure Clinic

## 2021-06-26 NOTE — PROGRESS NOTES
Progress Notes by Khloe Liao CNP at 9/5/2018  2:10 PM     Author: Khloe Liao CNP Service: -- Author Type: Nurse Practitioner    Filed: 9/5/2018  2:33 PM Encounter Date: 9/5/2018 Status: Signed    : Khloe Liao CNP (Nurse Practitioner)           Click to link to [facility] HEART CARE NOTE      Assessment/Recommendations   Assessment:    1.  Heart failure with reduced ejection fraction, ejection fraction 26%, NYHA class I: He denies any symptoms of acute heart failure.  He is tolerating Entresto titration and is now at maximum dose.  His carvedilol dose could be increased further.  Pablito would like to check his ejection fraction prior to further medication titration.  His ejection fraction has not been checked in over a year.    Plan:  1.   Heart failure medications:  - Beta blocker therapy with carvedilol 12.5 mg twice daily  - ARNI therapy with Entresto 97/103 mg twice daily  2.  BMP pending  3.  Schedule echocardiogram in November.  If ejection fraction remains low, will increase carvedilol.    Pablito Hurley will follow up in the heart failure clinic in November.     History of Present Illness    Mr. Pablito Hurley is a 58 y.o. male seen at [facility] heart failure clinic today for continued follow-up.  She has a history of heart failure with reduced ejection fraction, hypertension, STEMI with drug-eluting stent to LAD in 2016, CVA, and CRT-D following cardiac arrest during cataract surgery in April 2017.  Echocardiogram in June 2017 showed ejection fraction of 26%, mild to moderate mitral regurgitation, and mild to moderate tricuspid regurgitation.    He has been on Entresto 97/103 mg twice daily for the past 2 weeks.  He denies any problems on this increased dose.  He is no longer taking Lasix.  He denies any acute heart failure symptoms.  He denies fatigue, lightheadedness, shortness of breath, dyspnea on exertion, orthopnea, chest  pain and lower extremity edema.       Physical Examination Review of Systems   Vitals:    09/05/18 1404   BP: 116/72   Pulse: 68   Resp: 16     Body mass index is 27.12 kg/(m^2).  Wt Readings from Last 3 Encounters:   09/05/18 158 lb (71.7 kg)   07/23/18 156 lb (70.8 kg)   07/02/18 154 lb (69.9 kg)       General Appearance:     Alert, cooperative and in no acute distress.   ENT/Mouth: membranes moist, no oral lesions or bleeding gums.      EYES:  no scleral icterus, normal conjunctivae   Chest/Lungs:   lungs are clear to auscultation, no rales or wheezing, respirations unlabored   Cardiovascular:   Regular. Normal first and second heart sounds, no edema bilateral lower extremities    Abdomen:  Soft, nontender, nondistended, bowel sounds present   Extremities: no cyanosis or clubbing   Skin: warm, dry.    Neurologic: mood and affect are appropriate, alert and oriented x3      General: WNL  Eyes: WNL  Ears/Nose/Throat: WNL  Lungs: Snoring  Heart: WNL  Stomach: WNL  Bladder: WNL  Muscle/Joints: Joint Pain  Skin: WNL  Nervous System: WNL  Mental Health: WNL     Blood: WNL     Medical History  Surgical History Family History Social History   Past Medical History:   Diagnosis Date   ? Benign essential hypertension    ? Cardiac pacemaker in situ    ? Coronary artery disease due to lipid rich plaque 1/29/2016   ? Dyslipidemia    ? h/o Ventricular mural thrombus 7/18/2016   ? History of stroke with residual deficit left hemiparesis     Created by Conversion    ? Type 2 diabetes mellitus (H)     Past Surgical History:   Procedure Laterality Date   ? cardiac pacemaker  02/01/2016    Family History   Problem Relation Age of Onset   ? Diabetes type II Mother     Social History     Social History   ? Marital status:      Spouse name: N/A   ? Number of children: 6   ? Years of education: N/A     Occupational History   ? Not on file.     Social History Main Topics   ? Smoking status: Never Smoker   ? Smokeless tobacco: Never  Used   ? Alcohol use No   ? Drug use: No   ? Sexual activity: Not on file     Other Topics Concern   ? Not on file     Social History Narrative          Medications  Allergies   Current Outpatient Prescriptions   Medication Sig Dispense Refill   ? atorvastatin (LIPITOR) 80 MG tablet Take 1 tablet (80 mg total) by mouth at bedtime. TXHUA HMO THAUM MUS PW NOJ 1 LUB PAB SHEA NTSHAV HAKAN ROJ 90 tablet 1   ? carvedilol (COREG) 12.5 MG tablet Take 1 tablet (12.5 mg total) by mouth 2 (two) times a day. 180 tablet 2   ? clopidogrel (PLAVIX) 75 mg tablet Take 1 tablet (75 mg total) by mouth daily. 90 tablet 1   ? sacubitril-valsartan (ENTRESTO)  mg Tab tablet Take 1 tablet by mouth 2 (two) times a day. 180 tablet 3     No current facility-administered medications for this visit.       Allergies   Allergen Reactions   ? Ace Inhibitors Cough         Lab Results    Chemistry CBC BNP   Lab Results   Component Value Date    CREATININE 0.80 07/23/2018    BUN 14 07/23/2018     07/23/2018    K 4.7 07/23/2018     07/23/2018    CO2 26 07/23/2018     Creatinine (mg/dL)   Date Value   07/23/2018 0.80   07/02/2018 0.81   06/08/2018 0.91   05/02/2018 1.01    Lab Results   Component Value Date    WBC 11.6 (H) 05/02/2018    HGB 17.3 05/02/2018    HCT 50.9 05/02/2018    MCV 90 05/02/2018     05/02/2018    Lab Results   Component Value Date     (H) 06/08/2018     BNP (pg/mL)   Date Value   06/08/2018 173 (H)   05/02/2018 1452 (H)            Khloe Liao, [facility]   Heart Failure Clinic

## 2021-07-03 NOTE — ADDENDUM NOTE
Addendum Note by Amy Wray MD at 4/10/2019  3:40 PM     Author: Amy Wray MD Service: -- Author Type: Physician    Filed: 4/11/2019  7:49 AM Encounter Date: 4/10/2019 Status: Signed    : Amy Wray MD (Physician)    Addended by: AMY WRAY on: 4/11/2019 07:49 AM        Modules accepted: Orders

## 2021-07-04 NOTE — ADDENDUM NOTE
Addendum Note by Jessie Silva at 12/2/2020 11:59 PM     Author: Jessie Silva Service: -- Author Type: --    Filed: 6/23/2021  4:16 PM Encounter Date: 12/2/2020 Status: Signed    : Jessie Silva    Addended by: JESSIE SILVA on: 6/23/2021 04:16 PM        Modules accepted: Orders